# Patient Record
Sex: FEMALE | Race: WHITE | NOT HISPANIC OR LATINO | Employment: FULL TIME | URBAN - METROPOLITAN AREA
[De-identification: names, ages, dates, MRNs, and addresses within clinical notes are randomized per-mention and may not be internally consistent; named-entity substitution may affect disease eponyms.]

---

## 2017-02-27 ENCOUNTER — OFFICE VISIT (OUTPATIENT)
Dept: INTERNAL MEDICINE | Facility: CLINIC | Age: 32
End: 2017-02-27

## 2017-02-27 VITALS
SYSTOLIC BLOOD PRESSURE: 112 MMHG | OXYGEN SATURATION: 98 % | TEMPERATURE: 98.8 F | HEART RATE: 58 BPM | HEIGHT: 68 IN | DIASTOLIC BLOOD PRESSURE: 70 MMHG | WEIGHT: 113.32 LBS | BODY MASS INDEX: 17.17 KG/M2

## 2017-02-27 DIAGNOSIS — J06.9 ACUTE URI: Primary | ICD-10-CM

## 2017-02-27 DIAGNOSIS — Z72.0 TOBACCO USE: ICD-10-CM

## 2017-02-27 DIAGNOSIS — R53.83 OTHER FATIGUE: ICD-10-CM

## 2017-02-27 LAB
EXPIRATION DATE: NORMAL
EXPIRATION DATE: NORMAL
FLUAV AG NPH QL: NORMAL
FLUBV AG NPH QL: NEGATIVE
INTERNAL CONTROL: NORMAL
INTERNAL CONTROL: NORMAL
Lab: NORMAL
Lab: NORMAL
S PYO AG THROAT QL: NEGATIVE

## 2017-02-27 PROCEDURE — 87880 STREP A ASSAY W/OPTIC: CPT | Performed by: PHYSICIAN ASSISTANT

## 2017-02-27 PROCEDURE — 87804 INFLUENZA ASSAY W/OPTIC: CPT | Performed by: PHYSICIAN ASSISTANT

## 2017-02-27 PROCEDURE — 99406 BEHAV CHNG SMOKING 3-10 MIN: CPT | Performed by: PHYSICIAN ASSISTANT

## 2017-02-27 PROCEDURE — 99213 OFFICE O/P EST LOW 20 MIN: CPT | Performed by: PHYSICIAN ASSISTANT

## 2017-02-27 NOTE — ASSESSMENT & PLAN NOTE
Smoking Cessation Counseling  DX: tobacco use    Discussed current use pattern.  Follow up with PCP in 1 month or as needed.  Sent in prescription for Chantix  Barriers: friends smoke and spouse/partner smokes  Time spent counseling: 3-10 mintues

## 2017-02-27 NOTE — PROGRESS NOTES
Chief Complaint   Patient presents with   • Sore Throat     C/o dry cough, low grade temp, thoracic back pain and neck pain and sore throat x 3 days       Subjective   Yari Carlson is a 31 y.o. female.       History of Present Illness     Pt started feeling bad 3 days ago with fatigue and malaise. Had to work the next 2 days, 12+ hr days. By the end of day yesterday felt feverish. Has ST, neck pain, hurts to take deep breaths. Mild cough and sneezing and congestion. Not taking anything otc. Some sick contacts at work, no known flu.        Current Outpatient Prescriptions:   •  APAP-isometheptene-dichloral -325 MG per capsule, Take 1 capsule by mouth Every 6 (Six) Hours As Needed., Disp: , Rfl:   •  topiramate (TOPAMAX) 100 MG tablet, Take 1 tablet by mouth Every Night., Disp: 30 tablet, Rfl: 11  •  varenicline (CHANTIX STARTING MONTH PAK) 0.5 MG X 11 & 1 MG X 42 tablet, Take 0.5 mg one daily on days 1-2 and 0.5 mg twice daily on days 4-7. Then 1 mg twice daily for a total of 12 weeks., Disp: 53 tablet, Rfl: 0  •  vitamin B-12 (CYANOCOBALAMIN) 1000 MCG tablet, Take 1 tablet by mouth Daily., Disp: 30 tablet, Rfl: 11     PMFSH  The following portions of the patient's history were reviewed and updated as appropriate: allergies, current medications, past family history, past medical history, past social history, past surgical history and problem list.    Review of Systems   Constitutional: Positive for fatigue. Negative for activity change and unexpected weight change.   HENT: Positive for congestion, postnasal drip and sore throat. Negative for ear pain.    Eyes: Negative for pain and discharge.   Respiratory: Positive for cough. Negative for chest tightness, shortness of breath and wheezing.    Cardiovascular: Negative for chest pain and palpitations.   Gastrointestinal: Negative for abdominal pain, diarrhea and vomiting.   Endocrine: Negative.    Genitourinary: Negative.    Musculoskeletal: Negative for  "joint swelling.   Skin: Negative for color change, rash and wound.   Allergic/Immunologic: Negative.    Neurological: Negative for seizures and syncope.   Psychiatric/Behavioral: Negative.    All other systems reviewed and are negative.      Objective   Visit Vitals   • /70   • Pulse 58   • Temp 98.8 °F (37.1 °C)   • Ht 68\" (172.7 cm)   • Wt 113 lb 5.1 oz (51.4 kg)   • SpO2 98%   • BMI 17.23 kg/m2       Physical Exam   Constitutional: She is oriented to person, place, and time. She appears well-developed and well-nourished.  Non-toxic appearance. No distress.   HENT:   Head: Normocephalic and atraumatic. Hair is normal.   Right Ear: External ear normal. No drainage, swelling or tenderness. Tympanic membrane is retracted.   Left Ear: External ear normal. No drainage, swelling or tenderness. Tympanic membrane is retracted.   Nose: Mucosal edema present. No epistaxis.   Mouth/Throat: Uvula is midline and mucous membranes are normal. No oral lesions. No uvula swelling. Posterior oropharyngeal erythema present. No oropharyngeal exudate.   Eyes: Conjunctivae and EOM are normal. Pupils are equal, round, and reactive to light. Right eye exhibits no discharge. Left eye exhibits no discharge. No scleral icterus.   Neck: Normal range of motion. Neck supple.   Cardiovascular: Normal rate, regular rhythm and normal heart sounds.  Exam reveals no gallop.    No murmur heard.  Pulmonary/Chest: Breath sounds normal. No stridor. No respiratory distress. She has no wheezes. She has no rales. She exhibits no tenderness.   Abdominal: Soft. There is no tenderness.   Lymphadenopathy:     She has cervical adenopathy.   Neurological: She is alert and oriented to person, place, and time. She exhibits normal muscle tone.   Skin: Skin is warm and dry. No rash noted. She is not diaphoretic.   Psychiatric: She has a normal mood and affect. Her behavior is normal. Judgment and thought content normal.   Nursing note and vitals " reviewed.      Results for orders placed or performed in visit on 02/27/17   POCT Influenza A/B   Result Value Ref Range    Rapid Influenza A Ag negaitive     Rapid Influenza B Ag negative     Internal Control Passed Passed    Lot Number [id number]     Expiration Date 4/2017    POCT rapid strep A   Result Value Ref Range    Rapid Strep A Screen Negative Negative, VALID, INVALID, Not Performed    Internal Control Passed Passed    Lot Number [id number]     Expiration Date 3/2018         ASSESSMENT/PLAN    Problem List Items Addressed This Visit        Other    Fatigue    Relevant Orders    POCT Influenza A/B (Completed)    Tobacco use     Smoking Cessation Counseling  DX: tobacco use    Discussed current use pattern.  Follow up with PCP in 1 month or as needed.  Sent in prescription for Chantix  Barriers: friends smoke and spouse/partner smokes  Time spent counseling: 3-10 mintues                 Relevant Medications    varenicline (CHANTIX STARTING MONTH PAK) 0.5 MG X 11 & 1 MG X 42 tablet      Other Visit Diagnoses     Acute URI    -  Primary    Relevant Orders    POCT rapid strep A (Completed)      Use OTC symptomatic care, increase rest and fluids. Call if worsening or no improvement.           Return in about 4 weeks (around 3/27/2017) for Recheck on smoking cessation with Tere.

## 2017-02-28 ENCOUNTER — TELEPHONE (OUTPATIENT)
Dept: INTERNAL MEDICINE | Facility: CLINIC | Age: 32
End: 2017-02-28

## 2017-02-28 DIAGNOSIS — Z72.0 TOBACCO USE: Primary | ICD-10-CM

## 2017-02-28 RX ORDER — BUPROPION HYDROCHLORIDE 150 MG/1
TABLET, EXTENDED RELEASE ORAL
Qty: 60 TABLET | Refills: 1 | Status: SHIPPED | OUTPATIENT
Start: 2017-02-28 | End: 2017-03-20 | Stop reason: SINTOL

## 2017-02-28 NOTE — TELEPHONE ENCOUNTER
PA for Chantix was denied, denial ppw given to Bessie for further review.     Called and informed pt that it was denied and if she would like to try Wellbutrin instead, per pt she would like to give Wellbutrin a try.

## 2017-02-28 NOTE — TELEPHONE ENCOUNTER
2/28/17    Started a PA for chantix:    josh blanco (Key: [id number])     You will receive an [facility] within 24-72 hours. You can see the latest determination by locating this request on your dashboard or by reopening this request. You will also receive a faxed copy of the determination.

## 2017-02-28 NOTE — TELEPHONE ENCOUNTER
----- Message from Anay Lopez MA sent at 2/27/2017 11:16 AM EST -----      ----- Message -----     From: Nieves Rader     Sent: 2/27/2017  11:13 AM       To: [facility]    The patient is calling because her pharmacy told her that the chantix needed a PA. She just wanted to let the office know to be looking for it.

## 2017-02-28 NOTE — TELEPHONE ENCOUNTER
I sent in the wellbutrin  mg. She should pick a day to quit smoking and start the medication 7 days before the quit date. Follow instructions on the bottle. thanks

## 2017-03-20 ENCOUNTER — OFFICE VISIT (OUTPATIENT)
Dept: INTERNAL MEDICINE | Facility: CLINIC | Age: 32
End: 2017-03-20

## 2017-03-20 VITALS
TEMPERATURE: 98 F | DIASTOLIC BLOOD PRESSURE: 60 MMHG | HEIGHT: 68 IN | OXYGEN SATURATION: 99 % | SYSTOLIC BLOOD PRESSURE: 108 MMHG | BODY MASS INDEX: 16.97 KG/M2 | WEIGHT: 112 LBS | HEART RATE: 104 BPM

## 2017-03-20 DIAGNOSIS — N30.00 ACUTE CYSTITIS WITHOUT HEMATURIA: ICD-10-CM

## 2017-03-20 DIAGNOSIS — R11.0 NAUSEA: ICD-10-CM

## 2017-03-20 DIAGNOSIS — R10.9 ABDOMINAL PAIN IN FEMALE: Primary | ICD-10-CM

## 2017-03-20 LAB
BILIRUB BLD-MCNC: ABNORMAL MG/DL
CLARITY, POC: CLEAR
COLOR UR: ABNORMAL
GLUCOSE UR STRIP-MCNC: NEGATIVE MG/DL
KETONES UR QL: NEGATIVE
LEUKOCYTE EST, POC: ABNORMAL
NITRITE UR-MCNC: NEGATIVE MG/ML
PH UR: 7 [PH] (ref 5–8)
PROT UR STRIP-MCNC: NEGATIVE MG/DL
RBC # UR STRIP: NEGATIVE /UL
SP GR UR: 1.01 (ref 1–1.03)
UROBILINOGEN UR QL: ABNORMAL

## 2017-03-20 PROCEDURE — 99213 OFFICE O/P EST LOW 20 MIN: CPT | Performed by: NURSE PRACTITIONER

## 2017-03-20 PROCEDURE — 81003 URINALYSIS AUTO W/O SCOPE: CPT | Performed by: NURSE PRACTITIONER

## 2017-03-20 PROCEDURE — 87086 URINE CULTURE/COLONY COUNT: CPT | Performed by: NURSE PRACTITIONER

## 2017-03-20 RX ORDER — PROMETHAZINE HYDROCHLORIDE 25 MG/1
25 TABLET ORAL EVERY 6 HOURS PRN
Qty: 24 TABLET | Refills: 1 | Status: SHIPPED | OUTPATIENT
Start: 2017-03-20 | End: 2017-04-24 | Stop reason: SDUPTHER

## 2017-03-20 RX ORDER — NITROFURANTOIN 25; 75 MG/1; MG/1
100 CAPSULE ORAL 2 TIMES DAILY
Qty: 14 CAPSULE | Refills: 0 | Status: SHIPPED | OUTPATIENT
Start: 2017-03-20 | End: 2017-03-27

## 2017-03-20 NOTE — PROGRESS NOTES
Subjective  Abdominal Pain (ongoing for 24 hours, not able to eat much, burning sensation inside stomach ); Nausea; Dizziness; and Fatigue      Yari Carlson is a 31 y.o. female.   Allergies   Allergen Reactions   • Naproxen      History of Present Illness      Nausea, dizziness  X 3 days , generalized abd pain , can't eat , came on rather suddenly, had been at a child's birthday party , feeling feverish, feels a little like burning when she drinks coffee , no dysuria, no otc meds tried , some frequency but drinks a lot of water  The following portions of the patient's history were reviewed and updated as appropriate: allergies, current medications, past family history, past medical history, past social history, past surgical history and problem list.    Review of Systems   Constitutional: Positive for fatigue and fever.   Gastrointestinal: Positive for abdominal pain and nausea.   Neurological: Positive for dizziness.   All other systems reviewed and are negative.      Objective   Physical Exam   Constitutional: She is oriented to person, place, and time. She appears well-developed and well-nourished. No distress.   HENT:   Head: Normocephalic and atraumatic.   Eyes: Conjunctivae are normal. No scleral icterus.   Cardiovascular: Normal rate, regular rhythm and normal heart sounds.  Exam reveals no gallop.    No murmur heard.  Pulmonary/Chest: Effort normal and breath sounds normal. No respiratory distress. She has no wheezes. She has no rales. She exhibits no tenderness.   Abdominal: Soft. Bowel sounds are normal. She exhibits no distension, no abdominal bruit, no ascites and no mass. There is no splenomegaly or hepatomegaly. There is tenderness in the suprapubic area. There is no rigidity, no rebound, no guarding, no CVA tenderness, no tenderness at McBurney's point and negative Santiago's sign. No hernia.   Musculoskeletal: Normal range of motion. She exhibits no deformity.   Neurological: She is alert and  "oriented to person, place, and time.   Skin: Skin is warm and dry. No rash noted. She is not diaphoretic.   Psychiatric: She has a normal mood and affect. Her behavior is normal. Judgment and thought content normal.   Nursing note and vitals reviewed.    Visit Vitals   • /60   • Pulse 104   • Temp 98 °F (36.7 °C)   • Ht 68\" (172.7 cm)   • Wt 112 lb (50.8 kg)   • SpO2 99%   • BMI 17.03 kg/m2       Assessment/Plan     Problem List Items Addressed This Visit     None      Visit Diagnoses     Abdominal pain in female    -  Primary    Relevant Orders    POCT urinalysis dipstick, automated (Completed)    Acute cystitis without hematuria        Relevant Medications    nitrofurantoin, macrocrystal-monohydrate, (MACROBID) 100 MG capsule    Nausea        Relevant Medications    promethazine (PHENERGAN) 25 MG tablet          Results for orders placed or performed in visit on 03/20/17   POCT urinalysis dipstick, automated   Result Value Ref Range    Color Dark Yellow Yellow, Straw, Dark Yellow, Hazel    Clarity, UA Clear Clear    Glucose, UA Negative Negative, 1000 mg/dL (3+) mg/dL    Bilirubin 1 mg/dL (A) Negative    Ketones, UA Negative Negative    Specific Gravity  1.010 1.005 - 1.030    Blood, UA Negative Negative    pH, Urine 7.0 5.0 - 8.0    Protein, POC Negative Negative mg/dL    Urobilinogen, UA 4 E.U./dL  (A) Normal    Leukocytes 500 Josefa/ul (A) Negative    Nitrite, UA Negative Negative     Will send urine for cx, increase fluids     "

## 2017-03-22 LAB — BACTERIA SPEC AEROBE CULT: NORMAL

## 2017-04-03 ENCOUNTER — OFFICE VISIT (OUTPATIENT)
Dept: INTERNAL MEDICINE | Facility: CLINIC | Age: 32
End: 2017-04-03

## 2017-04-03 VITALS
BODY MASS INDEX: 17.13 KG/M2 | HEART RATE: 92 BPM | OXYGEN SATURATION: 98 % | DIASTOLIC BLOOD PRESSURE: 62 MMHG | WEIGHT: 113 LBS | HEIGHT: 68 IN | SYSTOLIC BLOOD PRESSURE: 108 MMHG

## 2017-04-03 DIAGNOSIS — Z72.0 TOBACCO ABUSE: Primary | ICD-10-CM

## 2017-04-03 PROCEDURE — 99406 BEHAV CHNG SMOKING 3-10 MIN: CPT | Performed by: NURSE PRACTITIONER

## 2017-04-03 PROCEDURE — 99213 OFFICE O/P EST LOW 20 MIN: CPT | Performed by: NURSE PRACTITIONER

## 2017-04-03 RX ORDER — POLYETHYLENE GLYCOL 3350 17 G
4 POWDER IN PACKET (EA) ORAL AS NEEDED
Qty: 135 LOZENGE | Refills: 1 | Status: SHIPPED | OUTPATIENT
Start: 2017-04-03 | End: 2017-10-20

## 2017-04-03 RX ORDER — NORETHINDRONE 0.35 MG/1
TABLET ORAL
COMMUNITY
Start: 2017-03-29 | End: 2017-06-01 | Stop reason: SDUPTHER

## 2017-04-03 NOTE — PROGRESS NOTES
"Subjective  Follow-up (Wellbutrin caused panic attacks, would like to discuss changing meds)      Yari Carlson is a 31 y.o. female.   Allergies   Allergen Reactions   • Naproxen      History of Present Illness      Was tried on wellbutrin for smoking and it caused her to have anxiety , is smoking almost 1 ppd , [place] [employer] , would like to be on chantix but knows her ins will not pay   The following portions of the patient's history were reviewed and updated as appropriate: allergies, current medications, past family history, past medical history, past social history, past surgical history and problem list.    Review of Systems   Respiratory:        Smoker   All other systems reviewed and are negative.      Objective   Physical Exam   Constitutional: She is oriented to person, place, and time. She appears well-developed and well-nourished.   Pulmonary/Chest: Effort normal.   Neurological: She is alert and oriented to person, place, and time.   Skin: Skin is warm and dry.   Psychiatric: She has a normal mood and affect. Her behavior is normal. Judgment and thought content normal.   Nursing note and vitals reviewed.    /62  Pulse 92  Ht 68\" (172.7 cm)  Wt 113 lb (51.3 kg)  SpO2 98%  BMI 17.18 kg/m2    Assessment/Plan     Problem List Items Addressed This Visit     None      Visit Diagnoses     Tobacco abuse    -  Primary    Relevant Medications    nicotine polacrilex (EQL NICOTINE) 4 MG lozenge        Smoking Cessation Counseling  DX:tobacco abuse  I advised patient to quit, and offered support.  Discussed current use pattern.  Barriers: friends smoke, spouse/partner smokes and under a lot of stress now  Time spent counseling: 3-10 mintues      rtc 5 weeks         "

## 2017-04-05 ENCOUNTER — TELEPHONE (OUTPATIENT)
Dept: INTERNAL MEDICINE | Facility: CLINIC | Age: 32
End: 2017-04-05

## 2017-04-17 ENCOUNTER — OFFICE VISIT (OUTPATIENT)
Dept: OBSTETRICS AND GYNECOLOGY | Facility: CLINIC | Age: 32
End: 2017-04-17

## 2017-04-17 VITALS
DIASTOLIC BLOOD PRESSURE: 64 MMHG | WEIGHT: 113.2 LBS | SYSTOLIC BLOOD PRESSURE: 92 MMHG | HEIGHT: 68 IN | BODY MASS INDEX: 17.16 KG/M2

## 2017-04-17 DIAGNOSIS — Z30.41 SURVEILLANCE FOR BIRTH CONTROL, ORAL CONTRACEPTIVES: ICD-10-CM

## 2017-04-17 DIAGNOSIS — R87.612 LOW GRADE SQUAMOUS INTRAEPITHELIAL LESION ON CYTOLOGIC SMEAR OF CERVIX (LGSIL): Primary | ICD-10-CM

## 2017-04-17 PROCEDURE — 99213 OFFICE O/P EST LOW 20 MIN: CPT | Performed by: OBSTETRICS & GYNECOLOGY

## 2017-04-17 NOTE — PROGRESS NOTES
Chief Complaint   Patient presents with   • Follow-up     repeat pap   • Menstrual Problem     irregular menses on [facility]'s       Yari Carlson is a 31 y.o. year old  presenting to be seen for a PAP in follow-up of a prior abnormal PAP and/or HPV screen. Her previous Pap test was read as low-grade NICCI, with HPV, non-16/18 positivity.  She is currently on a progestin-only oral contraceptive and has irregular menses.  She states that she has had heavy bleeding on other forms of contraception.    Her last PAP: was done on approximately 2016 and the result was: LGSIL - mild dysplasia.  Her last HPV test: was done on approximately 2016 and the result was:  HPV non/16/18 (+)  Uses tobacco currently: no  Sexually active: yes  Current contraception: oral progesterone-only contraceptive    No Additional Complaints Reported    The following portions of the patient's history were reviewed and updated as appropriate:vital signs and   She  does not have any pertinent problems on file.  She  has a past surgical history that includes Abdominal surgery and Tooth extraction.  Her family history includes Arthritis in her paternal aunt; Cancer in her maternal grandmother, other, and paternal grandmother; Diabetes in her maternal grandfather, maternal grandmother, other, paternal grandfather, and paternal grandmother; Heart disease in her maternal grandmother; Hyperlipidemia in her maternal grandmother; Lupus in her paternal aunt; Migraines in her maternal grandfather and mother; Obesity in her maternal grandmother; Other in her other; Stroke in her other; Thyroid disease in her mother.  She  reports that she has been smoking.  She has never used smokeless tobacco. She reports that she drinks alcohol. She reports that she does not use illicit drugs.  Current Outpatient Prescriptions   Medication Sig Dispense Refill   • APAP-isometheptene-dichloral -325 MG per capsule Take 1 capsule by mouth Every 6 (Six) Hours  "As Needed.     • ALYSA 0.35 MG tablet      • nicotine polacrilex (EQL NICOTINE) 4 MG lozenge Dissolve 1 lozenge in the mouth As Needed for Smoking Cessation. 135 lozenge 1   • promethazine (PHENERGAN) 25 MG tablet Take 1 tablet by mouth Every 6 (Six) Hours As Needed for Nausea or Vomiting. 24 tablet 1   • topiramate (TOPAMAX) 100 MG tablet Take 1 tablet by mouth Every Night. 30 tablet 11   • vitamin B-12 (CYANOCOBALAMIN) 1000 MCG tablet Take 1 tablet by mouth Daily. 30 tablet 11     No current facility-administered medications for this visit.      She is allergic to naproxen..    Review of Systems  A comprehensive review of systems was negative.  Constitutional: negative for fever, chills, activity change, appetite change, fatigue and unexpected weight change.  Respiratory: negative  Cardiovascular: negative  Gastrointestinal: negative  Genitourinary:negative  Musculoskeletal:negative  Behavioral/Psych: negative        BP 92/64  Ht 68\" (172.7 cm)  Wt 113 lb 3.2 oz (51.3 kg)  LMP 02/01/2017  BMI 17.21 kg/m2    Physical Exam    General:  alert; cooperative; well developed; well nourished   Thyroid: normal to inspection and palpation   Abdomen: soft, non-tender; no masses  no umbilical or inginual hernias are present  no hepato-splenomegaly   Pelvis: Clinical staff was present for exam  External genitalia:  normal appearance of the external genitalia including Bartholin's and [place]'s glands.  Vaginal:  normal pink mucosa without prolapse or lesions.  Cervix:  normal appearance.  Uterus:  normal size, shape and consistency. anteverted;  Adnexa:  normal bimanual exam of the adnexa.  Rectal:  anus visually normal appearing. recto-vaginal exam unremarkable and confirms findings;     Lab Review   Pap results    Imaging   No data reviewed       ASSESSMENT  Problems Addressed this Visit        Other    Abnormal Pap smear of cervix - Primary    Relevant Orders    Liquid-based Pap Smear, Screening    Surveillance for birth " control, oral contraceptives              PLAN   Pap test done  Follow up: 6 month(s)   *Please note that portions of this documentation may have been completed with a voice recognition program.  Efforts were made to edit this dictation, but occasional words may have been mistranscribed.  This note was electronically signed.    STORMY uMrillo MD  April 17, 2017  3:29 PM

## 2017-04-24 ENCOUNTER — OFFICE VISIT (OUTPATIENT)
Dept: NEUROLOGY | Facility: CLINIC | Age: 32
End: 2017-04-24

## 2017-04-24 ENCOUNTER — TELEPHONE (OUTPATIENT)
Dept: NEUROLOGY | Facility: CLINIC | Age: 32
End: 2017-04-24

## 2017-04-24 VITALS
RESPIRATION RATE: 18 BRPM | HEART RATE: 101 BPM | HEIGHT: 68 IN | SYSTOLIC BLOOD PRESSURE: 93 MMHG | DIASTOLIC BLOOD PRESSURE: 61 MMHG | WEIGHT: 113 LBS | BODY MASS INDEX: 17.13 KG/M2

## 2017-04-24 DIAGNOSIS — G43.009 MIGRAINE WITHOUT AURA AND WITHOUT STATUS MIGRAINOSUS, NOT INTRACTABLE: ICD-10-CM

## 2017-04-24 DIAGNOSIS — R11.0 NAUSEA: ICD-10-CM

## 2017-04-24 DIAGNOSIS — G43.709 CHRONIC MIGRAINE WITHOUT AURA WITHOUT STATUS MIGRAINOSUS, NOT INTRACTABLE: Primary | ICD-10-CM

## 2017-04-24 PROCEDURE — 99213 OFFICE O/P EST LOW 20 MIN: CPT | Performed by: NURSE PRACTITIONER

## 2017-04-24 RX ORDER — PROMETHAZINE HYDROCHLORIDE 25 MG/1
25 TABLET ORAL EVERY 6 HOURS PRN
Qty: 24 TABLET | Refills: 3 | Status: SHIPPED | OUTPATIENT
Start: 2017-04-24 | End: 2017-12-29 | Stop reason: SDUPTHER

## 2017-04-24 NOTE — PROGRESS NOTES
Subjective:     Patient ID: Yari Carlson is a 31 y.o. female.    History of Present Illness   Here for 6 month follow up on chronic migraine headaches since age 6. She has been having 2-3 migraines a month for the past 6 months. Over the past 2 weeks she has had 5 migraines. She is not sure if this is related to weather changes or not. She has had a lot of nausea with her migraines. She is currently taking Topamax 100mg QHS and tolerating this well for migraine prevention. She takes vitamin b12 1000mcg daily for tingling and this has improved. She is taking Midrin PRN and Phenergan PRN and needs refills on these. She has nausea, occasional vomiting, photophobia, phonophobia, throbbing, unilateral, typically frontal, moderate to severe in nature and lasts 4-24 hours.     The following portions of the patient's history were reviewed and updated as appropriate: allergies, current medications, past family history, past medical history, past social history, past surgical history and problem list.    Review of Systems   Constitutional: Negative for chills, fatigue, fever and unexpected weight change.   HENT: Negative for ear pain, hearing loss, nosebleeds, rhinorrhea and sore throat.    Eyes: Negative for photophobia, pain, discharge, itching and visual disturbance.   Respiratory: Negative for cough, chest tightness, shortness of breath and wheezing.    Cardiovascular: Negative for chest pain, palpitations and leg swelling.   Gastrointestinal: Negative for abdominal pain, blood in stool, constipation, diarrhea, nausea and vomiting.   Genitourinary: Negative for dysuria, frequency, hematuria and urgency.   Musculoskeletal: Negative for arthralgias, back pain, gait problem, joint swelling, myalgias, neck pain and neck stiffness.   Skin: Negative for rash and wound.   Allergic/Immunologic: Negative for environmental allergies and food allergies.   Neurological: Positive for headaches. Negative for dizziness, tremors,  seizures, syncope, speech difficulty, weakness, light-headedness and numbness.   Hematological: Negative for adenopathy. Does not bruise/bleed easily.   Psychiatric/Behavioral: Negative for agitation, confusion, decreased concentration, hallucinations, sleep disturbance and suicidal ideas. The patient is not nervous/anxious.         Objective:    Neurologic Exam     Mental Status   Oriented to person, place, and time.   Level of consciousness: alert    Cranial Nerves   Cranial nerves II through XII intact.     Motor Exam   Muscle bulk: normal  Overall muscle tone: normal    Strength   Strength 5/5 throughout.     Gait, Coordination, and Reflexes     Gait  Gait: normal      Physical Exam   Constitutional: She is oriented to person, place, and time.   Neurological: She is oriented to person, place, and time. She has normal strength. Gait normal.       Assessment/Plan:     Yari was seen today for migraine.    Diagnoses and all orders for this visit:    Chronic migraine without aura without status migrainosus, not intractable  -     APAP-isometheptene-dichloral -325 MG per capsule; Take 1 capsule by mouth Every 6 (Six) Hours As Needed for migraine.    Migraine without aura and without status migrainosus, not intractable    Nausea  -     promethazine (PHENERGAN) 25 MG tablet; Take 1 tablet by mouth Every 6 (Six) Hours As Needed for Nausea or Vomiting.       Continue current meds. F/U 5-6 months. If migraines worsen or change, then she is to call for sooner appointment and would consider MRI brain eval. Reviewed medications, potential side effects and signs and symptoms to report. Discussed risk versus benefits of treatment plan with patient and/or family-including medications, labs and radiology that may be ordered. Addressed questions and concerns during visit. Patient and/or family verbalized understanding and agree with plan. As part of this patient's treatment plan I am prescribing controlled substances.  The patient has been made aware of appropriate use of such medications, including potential risk of somnolence, limited ability to drive and/or work safely, and potential for dependence or overdose. It has also been made clear that these medications are for use by the patient only, without concomitant use of alcohol or other substances unless prescribed. Keep out of reach of children.  Keith report has been reviewed. If this is going to be prescribed long term, [facility] [place]rolled [place] Agreement Contract has also been read and signed by patient and myself. Labs due next visit if not already done cbc and cmp.

## 2017-06-01 NOTE — TELEPHONE ENCOUNTER
"Pls advise on refill, per chart says \"Historical Prescriber.\" Have you been prescribing this for pt? LOV: 04/03/17.  "

## 2017-06-02 RX ORDER — NORETHINDRONE 0.35 MG/1
TABLET ORAL
Qty: 28 TABLET | Refills: 9 | Status: SHIPPED | OUTPATIENT
Start: 2017-06-02 | End: 2018-01-13 | Stop reason: SDUPTHER

## 2017-06-26 ENCOUNTER — OFFICE VISIT (OUTPATIENT)
Dept: INTERNAL MEDICINE | Facility: CLINIC | Age: 32
End: 2017-06-26

## 2017-06-26 VITALS
BODY MASS INDEX: 17.03 KG/M2 | WEIGHT: 112 LBS | HEART RATE: 110 BPM | SYSTOLIC BLOOD PRESSURE: 100 MMHG | OXYGEN SATURATION: 99 % | DIASTOLIC BLOOD PRESSURE: 62 MMHG

## 2017-06-26 DIAGNOSIS — B37.31 VAGINAL CANDIDIASIS: ICD-10-CM

## 2017-06-26 DIAGNOSIS — N30.00 ACUTE CYSTITIS WITHOUT HEMATURIA: Primary | ICD-10-CM

## 2017-06-26 LAB
BILIRUB BLD-MCNC: NEGATIVE MG/DL
CLARITY, POC: CLEAR
COLOR UR: YELLOW
GLUCOSE UR STRIP-MCNC: NEGATIVE MG/DL
KETONES UR QL: NEGATIVE
LEUKOCYTE EST, POC: ABNORMAL
NITRITE UR-MCNC: NEGATIVE MG/ML
PH UR: 6 [PH] (ref 5–8)
PROT UR STRIP-MCNC: NEGATIVE MG/DL
RBC # UR STRIP: NEGATIVE /UL
SP GR UR: 1.01 (ref 1–1.03)
UROBILINOGEN UR QL: NORMAL

## 2017-06-26 PROCEDURE — 99213 OFFICE O/P EST LOW 20 MIN: CPT | Performed by: NURSE PRACTITIONER

## 2017-06-26 PROCEDURE — 81003 URINALYSIS AUTO W/O SCOPE: CPT | Performed by: NURSE PRACTITIONER

## 2017-06-26 RX ORDER — FLUCONAZOLE 150 MG/1
150 TABLET ORAL ONCE
Qty: 1 TABLET | Refills: 2 | Status: SHIPPED | OUTPATIENT
Start: 2017-06-26 | End: 2017-06-26

## 2017-06-26 RX ORDER — NITROFURANTOIN 25; 75 MG/1; MG/1
100 CAPSULE ORAL 2 TIMES DAILY
Qty: 14 CAPSULE | Refills: 0 | Status: SHIPPED | OUTPATIENT
Start: 2017-06-26 | End: 2017-07-03

## 2017-10-09 ENCOUNTER — OFFICE VISIT (OUTPATIENT)
Dept: NEUROLOGY | Facility: CLINIC | Age: 32
End: 2017-10-09

## 2017-10-09 VITALS
WEIGHT: 113 LBS | BODY MASS INDEX: 17.13 KG/M2 | HEART RATE: 79 BPM | DIASTOLIC BLOOD PRESSURE: 76 MMHG | SYSTOLIC BLOOD PRESSURE: 107 MMHG | HEIGHT: 68 IN | RESPIRATION RATE: 18 BRPM

## 2017-10-09 DIAGNOSIS — G43.709 CHRONIC MIGRAINE WITHOUT AURA WITHOUT STATUS MIGRAINOSUS, NOT INTRACTABLE: ICD-10-CM

## 2017-10-09 DIAGNOSIS — G43.719 INTRACTABLE CHRONIC MIGRAINE WITHOUT AURA AND WITHOUT STATUS MIGRAINOSUS: Primary | ICD-10-CM

## 2017-10-09 PROBLEM — Z72.0 TOBACCO USE: Status: RESOLVED | Noted: 2017-02-27 | Resolved: 2017-10-09

## 2017-10-09 PROCEDURE — 99213 OFFICE O/P EST LOW 20 MIN: CPT | Performed by: NURSE PRACTITIONER

## 2017-10-09 RX ORDER — TOPIRAMATE 100 MG/1
100 TABLET, FILM COATED ORAL NIGHTLY
Qty: 30 TABLET | Refills: 11 | Status: SHIPPED | OUTPATIENT
Start: 2017-10-09 | End: 2017-10-09

## 2017-10-09 RX ORDER — TOPIRAMATE 100 MG/1
100 CAPSULE, EXTENDED RELEASE ORAL NIGHTLY
Qty: 30 CAPSULE | Refills: 11 | Status: SHIPPED | OUTPATIENT
Start: 2017-10-09 | End: 2018-04-16

## 2017-10-09 NOTE — PROGRESS NOTES
Subjective:     Patient ID: Yari Carlson is a 31 y.o. female.    CC:   Chief Complaint   Patient presents with   • Migraine       HPI:   History of Present Illness   This is a 31-year-old female who presents for six-month follow-up on chronic migraine headaches which have been present since age 6 years old.  She has been taking topiramate  mg daily at bedtime for migraine prevention and was previously on the Trokendi  mg daily at bedtime which was helping her headaches more overall.  She tells me her insurance previously did not cover this and so she had to switch back to the generic.  She is wondering about switching back to the name brand as it was more effective for her migraines and she was having fewer of these per month.  She tells me she does continue to have chronic daily headaches which are more mild, aching and without migraines symptoms.  She tells me she is having about 4-5 migraines per month.  Her migraines occur on the right frontal and temporal area, throbbing, nausea, occasional vomiting, photophobia and phonophobia and last 5-12 hours.  She takes 2 Midrin which do seem to help.  She tells me that she was engaged and called this off and also has several family members with serious cancers and has had increased stress in her life.  She feels like her migraines have been more frequent but have not changed in the type of symptoms she has.  She denies any vision loss or unilateral weakness or other concerning symptoms.  She has never had an MRI of her brain.  She prefers to wait on any imaging unless she has changes in her migraines due to high cost.  She did quit smoking in June 2017.    The following portions of the patient's history were reviewed and updated as appropriate: allergies, current medications, past family history, past medical history, past social history, past surgical history and problem list.    Past Medical History:   Diagnosis Date   • Abnormal Pap smear of cervix     • Anxiety    • Bacterial vaginosis    • Dysfunctional uterine bleeding    • Headache    • Migraine        Past Surgical History:   Procedure Laterality Date   • ABDOMINAL SURGERY     • TOOTH EXTRACTION         Social History     Social History   • Marital status: Single     Spouse name: N/A   • Number of children: N/A   • Years of education: N/A     Occupational History   • Not on file.     Social History Main Topics   • Smoking status: Former Smoker     Packs/day: 0.25     Years: 7.00     Types: Cigarettes     Quit date: 6/1/2017   • Smokeless tobacco: Never Used   • Alcohol use Yes      Comment: social   • Drug use: No   • Sexual activity: Yes     Partners: Male     Birth control/ protection: OCP     Other Topics Concern   • Not on file     Social History Narrative       Family History   Problem Relation Age of Onset   • Thyroid disease Mother    • Migraines Mother    • Diabetes Maternal Grandmother    • Cancer Maternal Grandmother    • Heart disease Maternal Grandmother    • Hyperlipidemia Maternal Grandmother    • Obesity Maternal Grandmother    • Diabetes Maternal Grandfather    • Migraines Maternal Grandfather    • Diabetes Paternal Grandmother    • Cancer Paternal Grandmother    • Diabetes Paternal Grandfather      type 1   • Arthritis Paternal Aunt      rheumatoid   • Lupus Paternal Aunt    • Other Other      cardiac disorder   • Stroke Other    • Diabetes Other    • Cancer Other         Review of Systems   Constitutional: Negative for chills, fatigue, fever and unexpected weight change.   HENT: Negative for ear pain, hearing loss, nosebleeds, rhinorrhea and sore throat.    Eyes: Negative for photophobia, pain, discharge, itching and visual disturbance.   Respiratory: Negative for cough, chest tightness, shortness of breath and wheezing.    Cardiovascular: Negative for chest pain, palpitations and leg swelling.   Gastrointestinal: Negative for abdominal pain, blood in stool, constipation, diarrhea, nausea  and vomiting.   Genitourinary: Negative for dysuria, frequency, hematuria and urgency.   Musculoskeletal: Positive for neck pain and neck stiffness. Negative for arthralgias, back pain, gait problem, joint swelling and myalgias.   Skin: Negative for rash and wound.   Allergic/Immunologic: Negative for environmental allergies and food allergies.   Neurological: Positive for numbness and headaches. Negative for dizziness, tremors, seizures, syncope, speech difficulty, weakness and light-headedness.   Hematological: Negative for adenopathy. Does not bruise/bleed easily.   Psychiatric/Behavioral: Negative for agitation, confusion, decreased concentration, hallucinations, sleep disturbance and suicidal ideas. The patient is not nervous/anxious.         Objective:    Neurologic Exam     Mental Status   Oriented to person, place, and time.   Level of consciousness: alert    Cranial Nerves   Cranial nerves II through XII intact.     Motor Exam   Muscle bulk: normal  Overall muscle tone: normal    Strength   Strength 5/5 throughout.     Gait, Coordination, and Reflexes     Gait  Gait: normal    Coordination   Finger to nose coordination: normal  Heel to shin coordination: normal    Tremor   Resting tremor: absent  Intention tremor: absent  Action tremor: absent    Reflexes   Right brachioradialis: 2+  Left brachioradialis: 2+  Right biceps: 2+  Left biceps: 2+  Right triceps: 2+  Left triceps: 2+  Right patellar: 2+  Left patellar: 2+  Right achilles: 2+  Left achilles: 2+  Right : 2+  Left : 2+      Physical Exam   Constitutional: She is oriented to person, place, and time.   Neurological: She is oriented to person, place, and time. She has normal strength. She has a normal Finger-Nose-Finger Test and a normal Heel to Shin Test. Gait normal.   Reflex Scores:       Tricep reflexes are 2+ on the right side and 2+ on the left side.       Bicep reflexes are 2+ on the right side and 2+ on the left side.        Brachioradialis reflexes are 2+ on the right side and 2+ on the left side.       Patellar reflexes are 2+ on the right side and 2+ on the left side.       Achilles reflexes are 2+ on the right side and 2+ on the left side.      Assessment/Plan:       Yari was seen today for migraine.    Diagnoses and all orders for this visit:    Intractable chronic migraine without aura and without status migrainosus  -     TROKENDI  MG capsule sustained-release 24 hr; Take 100 mg by mouth Every Night.    Chronic migraine without aura without status migrainosus, not intractable  -     APAP-isometheptene-dichloral -325 MG per capsule; Take 1 capsule by mouth Every 6 (Six) Hours As Needed for Migraine.    Other orders  -     Discontinue: topiramate (TOPAMAX) 100 MG tablet; Take 1 tablet by mouth Every Night.         We will switch her back to the Trokendi  mg daily at bedtime for better coverage of her migraines.  We have refilled her Midrin for #60 with 2 refills.  She will call if she needs additional refills before her follow-up in 6 months.  She is also started a call if she has changes in her migraine symptoms to consider MRI of the brain.  She has had a lot of recent stressors and hopefully switching to the Trokendi XR will help at least decrease the frequency and severity of her headaches. As part of this patient's treatment plan I am prescribing controlled substances. The patient has been made aware of appropriate use of such medications, including potential risk of somnolence, limited ability to drive and/or work safely, and potential for dependence or overdose. It has also been made clear that these medications are for use by the patient only, without concomitant use of alcohol or other substances unless prescribed. Keep out of reach of children.  Keith report has been reviewed. If this is going to be prescribed long term, [facility] [place] Controlled Substance Agreement Contract has also been read and signed by  patient and myself.  Reviewed medications, potential side effects and signs and symptoms to report. Discussed risk versus benefits of treatment plan with patient and/or family-including medications, labs and radiology that may be ordered. Addressed questions and concerns during visit. Patient and/or family verbalized understanding and agree with plan.  Total time of visit 15 minutes face-to-face with 10 minutes spent in discussion and counseling on plan of care.    Gita Andersen, APRN  10/9/2017

## 2017-10-20 ENCOUNTER — TELEPHONE (OUTPATIENT)
Dept: INTERNAL MEDICINE | Facility: CLINIC | Age: 32
End: 2017-10-20

## 2017-10-20 ENCOUNTER — OFFICE VISIT (OUTPATIENT)
Dept: INTERNAL MEDICINE | Facility: CLINIC | Age: 32
End: 2017-10-20

## 2017-10-20 VITALS
DIASTOLIC BLOOD PRESSURE: 60 MMHG | BODY MASS INDEX: 17.28 KG/M2 | TEMPERATURE: 98.6 F | SYSTOLIC BLOOD PRESSURE: 104 MMHG | HEART RATE: 84 BPM | WEIGHT: 114 LBS | HEIGHT: 68 IN | OXYGEN SATURATION: 99 %

## 2017-10-20 DIAGNOSIS — J06.9 ACUTE URI: Primary | ICD-10-CM

## 2017-10-20 PROCEDURE — 99213 OFFICE O/P EST LOW 20 MIN: CPT | Performed by: NURSE PRACTITIONER

## 2017-10-20 RX ORDER — FLUTICASONE PROPIONATE 50 MCG
2 SPRAY, SUSPENSION (ML) NASAL DAILY
Qty: 1 BOTTLE | Refills: 0 | Status: SHIPPED | OUTPATIENT
Start: 2017-10-20 | End: 2018-03-04 | Stop reason: SDUPTHER

## 2017-10-20 RX ORDER — DEXTROMETHORPHAN HYDROBROMIDE AND PROMETHAZINE HYDROCHLORIDE 15; 6.25 MG/5ML; MG/5ML
5 SYRUP ORAL 4 TIMES DAILY PRN
Qty: 118 ML | Refills: 0 | Status: SHIPPED | OUTPATIENT
Start: 2017-10-20 | End: 2018-03-04

## 2017-10-20 NOTE — PATIENT INSTRUCTIONS
Upper Respiratory Infections include infection of the ears, nose/sinus, and throat. They are most often caused by a virus. Viruses can last 5-10 days and are not treated with antibiotics. Management of your symptoms is the goal of treatment.     Try to get as much rest as possible, drink water &/or herbal teas.Tylenol or Ibuprofen can also help ease pain and/or low grade fever. Sometimes immune support products like Emergen-C or Airborne can help you feel a little better sooner if they are started early on in the course of the illness.    For nasal congestion: you can use oxymetazoline (Afrin) twice a day for no more than 4 days. If your blood pressure is normal or well controlled you can use pseudoephedrine 30-60 mg every 6 hours as needed. This is sold at the pharmacy counter and usually works better than decongestants off the shelf.  For Sinus Pain/Pressure: Use warm compresses over nose & forehead, take hot steamy showers or breath in steam from a bowl of hot water, use cool-mist humidifier.  For Cough: I find that Delsym works quite well as does Vicks Vapor Rub applied to chest/neck at bedtime.  For Sore Throat: gargle periodically with warm salt water (1/2 tsp table salt in 8 oz warm water), sip on warm tea, use throat lozenges/cough drops.     If your symptoms get markedly worse, or have not begun to improve by day 7-8, please return to office for re-evaluation.

## 2017-10-20 NOTE — PROGRESS NOTES
Chief Complaint   Patient presents with   • Sinus Problem   • Nasal Congestion       HPI  Yari Carlson is a 31 y.o. female who presents with sinus pain/pressure, glands are swollen. Started 3 days ago. Has been drinking tea, soup, OTC dayquil & nyquil continues to get worse.      [facility]  The following portions of the patient's history were reviewed and updated as appropriate: allergies, current medications, past family history, past medical history, past social history, past surgical history and problem list.    Past Medical History:   Diagnosis Date   • Abnormal Pap smear of cervix    • Anxiety    • Bacterial vaginosis    • Dysfunctional uterine bleeding    • Headache    • Migraine    • Tobacco use 2/27/2017     Past Surgical History:   Procedure Laterality Date   • ABDOMINAL SURGERY     • TOOTH EXTRACTION       Patient Active Problem List    Diagnosis   • Surveillance for birth control, oral contraceptives [Z30.41]   • Migraine [G43.909]   • Anxiety [F41.9]   • Abnormal Pap smear of cervix [R87.619]   • Routine adult health maintenance [Z00.00]   • Vaginal discharge [N89.8]   • Pap smear, as part of routine gynecological examination [Z01.419]   • Numbness and tingling in both hands [R20.0, R20.2]   • Intractable chronic migraine without aura and without status migrainosus [G43.719]   • Bacterial vaginosis [N76.0, B96.89]   • Chills [R68.83]   • Chronic tension type headache [G44.229]   • Common migraine without aura [G43.009]   • Diverticulitis of colon [K57.32]   • Dysfunctional uterine bleeding [N93.8]   • Dysuria [R30.0]   • Fatigue [R53.83]   • Headache [R51]   • Recurrent cold sores [B00.1]   • Shoulder pain [M25.519]   • Strep pharyngitis [J02.0]   • Vertigo [R42]   • Vitamin D deficiency [E55.9]   • Menorrhagia with irregular cycle [N92.1]     Social History   Substance Use Topics   • Smoking status: Former Smoker     Packs/day: 0.25     Years: 7.00     Types: Cigarettes     Quit date: 6/1/2017   •  "Smokeless tobacco: Never Used   • Alcohol use Yes      Comment: social     Current Outpatient Prescriptions on File Prior to Visit   Medication Sig Dispense Refill   • APAP-isometheptene-dichloral -325 MG per capsule Take 1 capsule by mouth Every 6 (Six) Hours As Needed for Migraine. 60 capsule 2   • ALYSA 0.35 MG tablet TAKE 1 TABLET BY MOUTH DAILY. 28 tablet 9   • promethazine (PHENERGAN) 25 MG tablet Take 1 tablet by mouth Every 6 (Six) Hours As Needed for Nausea or Vomiting. 24 tablet 3   • TROKENDI  MG capsule sustained-release 24 hr Take 100 mg by mouth Every Night. 30 capsule 11   • vitamin B-12 (CYANOCOBALAMIN) 1000 MCG tablet Take 1 tablet by mouth Daily. 30 tablet 11     No current facility-administered medications on file prior to visit.          Review of Systems   Constitutional: Positive for fatigue. Negative for fever.   HENT: Positive for congestion, ear pain (pressure), facial swelling, postnasal drip, rhinorrhea, sinus pressure and sore throat.    Respiratory: Positive for cough (intermittent). Negative for shortness of breath and wheezing.    Cardiovascular: Negative.    Gastrointestinal: Negative.    Skin: Negative.    Neurological: Negative.    Hematological: Positive for adenopathy.   All other systems reviewed and are negative.          Objective   Blood pressure 104/60, pulse 84, temperature 98.6 °F (37 °C), height 68\" (172.7 cm), weight 114 lb (51.7 kg), SpO2 99 %.  Body mass index is 17.33 kg/(m^2).      Physical Exam   Constitutional: She is oriented to person, place, and time. She appears well-developed and well-nourished. She appears ill (mildly). No distress.   HENT:   Head: Normocephalic and atraumatic.   Right Ear: Tympanic membrane and ear canal normal. Tympanic membrane is not erythematous.   Left Ear: Tympanic membrane and ear canal normal. Tympanic membrane is not erythematous.   Nose: Mucosal edema and sinus tenderness present.   Mouth/Throat: Uvula is midline and " mucous membranes are normal. Posterior oropharyngeal erythema present. No oropharyngeal exudate or posterior oropharyngeal edema.   Eyes: Conjunctivae are normal. Pupils are equal, round, and reactive to light.   Neck: Normal range of motion.   Cardiovascular: Normal rate, regular rhythm and normal heart sounds.    Pulmonary/Chest: Effort normal and breath sounds normal. No respiratory distress. She has no wheezes.   Lymphadenopathy:     She has cervical adenopathy.   Neurological: She is alert and oriented to person, place, and time.   Skin: Skin is warm and dry. No rash noted.   Psychiatric: She has a normal mood and affect. Her speech is normal and behavior is normal. Thought content normal. Cognition and memory are normal.             ASSESSMENT/PLAN  1. Acute URI    Symptoms management, provided samples of Stahist AD        Plan of care reviewed with patient at the conclusion of today's visit. Education was provided in regards to diagnosis, management and any prescribed or recommended OTC medications.  Patient verbalizes Understanding of and agreement with management plan.      FOLLOW-UP  Return if symptoms worsen or fail to improve.      Future Appointments  Date Time Provider Department Center   10/25/2017 8:30 AM MD JACKIE Perez O[facility] None   4/9/2018 9:00 AM ELISHA Mendoza N [facility] None         Electronically signed by:  ELISHA Rios  10/20/2017

## 2017-10-25 ENCOUNTER — OFFICE VISIT (OUTPATIENT)
Dept: OBSTETRICS AND GYNECOLOGY | Facility: CLINIC | Age: 32
End: 2017-10-25

## 2017-10-25 VITALS
HEIGHT: 63 IN | WEIGHT: 113.8 LBS | BODY MASS INDEX: 20.16 KG/M2 | SYSTOLIC BLOOD PRESSURE: 102 MMHG | DIASTOLIC BLOOD PRESSURE: 66 MMHG

## 2017-10-25 DIAGNOSIS — N87.0 DYSPLASIA OF CERVIX, LOW GRADE (CIN 1): Primary | ICD-10-CM

## 2017-10-25 PROCEDURE — 99213 OFFICE O/P EST LOW 20 MIN: CPT | Performed by: OBSTETRICS & GYNECOLOGY

## 2017-10-25 NOTE — PROGRESS NOTES
Chief Complaint   Patient presents with   • Follow-up     repeat pap       Yari Carlson is a 31 y.o. year old  presenting to be seen for a PAP in follow-up of a prior abnormal PAP and/or HPV screen. She was initially referred for an abnormal Pap test in .  Her Pap test was read as low-grade NICCI.  On 2016 she underwent colposcopy with directed biopsies consistent with JONNY-1.  She is HPV,-16/18 positive.  A follow-up Pap test on 2017 was again read as low-grade NICCI with HPV positivity.  She returns today for follow-up.  We have previously had an extensive discussion about conservative management of mild dysplasia in young women to attempt to avoid cervical complications.  She understands this plan of management.  She has a long history of irregular menses and is currently treated with Cora oral contraceptives and has periods every 2-3 months.  She has no side effects on oral contraceptives.  She does have a history of migraine headaches.    Her last PAP: 2017 and the result was LGSIL  Her last HPV test: was done on approximately 2017 and the result was:  HPV non/16/18 (+)  Uses tobacco currently: no  Sexually active: yes  Current contraception: OCP (estrogen/progesterone)    No Additional Complaints Reported    The following portions of the patient's history were reviewed and updated as appropriate:vital signs and   She  does not have any pertinent problems on file.  She  has a past surgical history that includes Abdominal surgery and Tooth extraction.  Current Outpatient Prescriptions   Medication Sig Dispense Refill   • APAP-isometheptene-dichloral -325 MG per capsule Take 1 capsule by mouth Every 6 (Six) Hours As Needed for Migraine. 60 capsule 2   • CORA 0.35 MG tablet TAKE 1 TABLET BY MOUTH DAILY. 28 tablet 9   • Chlorcyclizine-Pseudoephed (STAHIST AD) 25-60 MG tablet Take 1 tablet by mouth 3 (Three) Times a Day As Needed (congestion). 21 tablet 0   •  "fluticasone (FLONASE) 50 MCG/ACT nasal spray 2 sprays into each nostril Daily. 1 bottle 0   • promethazine (PHENERGAN) 25 MG tablet Take 1 tablet by mouth Every 6 (Six) Hours As Needed for Nausea or Vomiting. 24 tablet 3   • promethazine-dextromethorphan (PROMETHAZINE-DM) 6.25-15 MG/5ML syrup Take 5 mL by mouth 4 (Four) Times a Day As Needed for Cough. 118 mL 0   • TROKENDI  MG capsule sustained-release 24 hr Take 100 mg by mouth Every Night. 30 capsule 11   • vitamin B-12 (CYANOCOBALAMIN) 1000 MCG tablet Take 1 tablet by mouth Daily. 30 tablet 11     No current facility-administered medications for this visit.      She is allergic to naproxen..    Review of Systems  A comprehensive review of systems was negative.  Constitutional: negative for fever, chills, activity change, appetite change, fatigue and unexpected weight change.  Respiratory: negative  Cardiovascular: negative  Gastrointestinal: negative  Genitourinary:negative  Musculoskeletal:not done  Behavioral/Psych: negative        /66  Ht 63\" (160 cm)  Wt 113 lb 12.8 oz (51.6 kg)  LMP 09/28/2017 (Exact Date)  BMI 20.16 kg/m2    Physical Exam    General:  alert; cooperative; well developed; well nourished   Thyroid: normal to inspection and palpation   Abdomen: soft, non-tender; no masses  no umbilical or inginual hernias are present  no hepato-splenomegaly   Pelvis: Clinical staff was present for exam  External genitalia:  normal appearance of the external genitalia including Bartholin's and Ottertail's glands.  Vaginal:  normal pink mucosa without prolapse or lesions.  Cervix:  normal appearance.  Uterus:  normal size, shape and consistency. anteverted;  Adnexa:  normal bimanual exam of the adnexa.     Lab Review   No data reviewed    Imaging   No data reviewed       ASSESSMENT  Problems Addressed this Visit        Genitourinary    Dysplasia of cervix, low grade (JONNY 1) - Primary    Relevant Orders    Liquid-based Pap Smear, Screening - ThinPrep " Vial, Cervix, Endocervix              PLAN   Pap test done  Follow up: 6 month(s) for wellness visit and Pap test. I have again counseled the patient about conservative management of low-grade NICCI and a young nulliparous female.  She understands this plan of management.  *Please note that portions of this documentation may have been completed with a voice recognition program.  Efforts were made to edit this dictation, but occasional words may have been mistranscribed.  This note was electronically signed.    STORMY Murillo MD  October 25, 2017  9:02 AM

## 2017-11-13 RX ORDER — OMEGA-3/DHA/EPA/FISH OIL 35-113.5MG
TABLET,CHEWABLE ORAL
Qty: 30 TABLET | Refills: 4 | Status: SHIPPED | OUTPATIENT
Start: 2017-11-13 | End: 2021-09-20

## 2017-12-12 ENCOUNTER — TELEPHONE (OUTPATIENT)
Dept: INTERNAL MEDICINE | Facility: CLINIC | Age: 32
End: 2017-12-12

## 2017-12-12 NOTE — TELEPHONE ENCOUNTER
Pt forgot her medication in her car, she is about to board a plane for work and can't run back to her car. . Would really like if you could call in a form of topomax 100 mg a day to get her through the next 2 weeks [facility]   P: [phone]  F: [phone]

## 2017-12-13 NOTE — TELEPHONE ENCOUNTER
Called and spoke with [facility] at the Number listed in the message and they stated they the patient has already had it refilled at another [facility] in [place]

## 2017-12-26 ENCOUNTER — OFFICE VISIT (OUTPATIENT)
Dept: INTERNAL MEDICINE | Facility: CLINIC | Age: 32
End: 2017-12-26

## 2017-12-26 VITALS
BODY MASS INDEX: 21.58 KG/M2 | OXYGEN SATURATION: 99 % | HEART RATE: 108 BPM | TEMPERATURE: 98.9 F | HEIGHT: 63 IN | SYSTOLIC BLOOD PRESSURE: 100 MMHG | WEIGHT: 121.8 LBS | DIASTOLIC BLOOD PRESSURE: 62 MMHG

## 2017-12-26 DIAGNOSIS — R68.89 FLU-LIKE SYMPTOMS: ICD-10-CM

## 2017-12-26 DIAGNOSIS — J02.9 SORE THROAT: Primary | ICD-10-CM

## 2017-12-26 LAB
EXPIRATION DATE: NORMAL
INTERNAL CONTROL: NORMAL
Lab: NORMAL
S PYO AG THROAT QL: NEGATIVE

## 2017-12-26 PROCEDURE — 87880 STREP A ASSAY W/OPTIC: CPT | Performed by: NURSE PRACTITIONER

## 2017-12-26 PROCEDURE — 87804 INFLUENZA ASSAY W/OPTIC: CPT | Performed by: NURSE PRACTITIONER

## 2017-12-26 PROCEDURE — 99213 OFFICE O/P EST LOW 20 MIN: CPT | Performed by: NURSE PRACTITIONER

## 2017-12-26 RX ORDER — OSELTAMIVIR PHOSPHATE 75 MG/1
75 CAPSULE ORAL 2 TIMES DAILY
Qty: 10 CAPSULE | Refills: 0 | Status: SHIPPED | OUTPATIENT
Start: 2017-12-26 | End: 2017-12-31

## 2017-12-26 NOTE — PROGRESS NOTES
Chief Complaint   Patient presents with   • Sore Throat     1 day   • Headache     1 day sinus pressure        HPI  Yari Carlson is a 32 y.o. female who presents with sneezing for a couple days and today feels like death, sore throat,headache, face hurts, neck hurts.  Has not taking any alleviating measures. Has not had flu vaccine this year.      [facility]  The following portions of the patient's history were reviewed and updated as appropriate: allergies, current medications, past family history, past medical history, past social history, past surgical history and problem list.    Past Medical History:   Diagnosis Date   • Abnormal Pap smear of cervix    • Anxiety    • Bacterial vaginosis    • Dysfunctional uterine bleeding    • Headache    • Migraine    • Tobacco use 2/27/2017     Past Surgical History:   Procedure Laterality Date   • ABDOMINAL SURGERY     • TOOTH EXTRACTION       Patient Active Problem List    Diagnosis   • Dysplasia of cervix, low grade (JONNY 1) [N87.0]   • Surveillance for birth control, oral contraceptives [Z30.41]   • Migraine [G43.909]   • Anxiety [F41.9]   • Routine adult health maintenance [Z00.00]   • Numbness and tingling in both hands [R20.0, R20.2]   • Intractable chronic migraine without aura and without status migrainosus [G43.719]   • Chills [R68.83]   • Chronic tension type headache [G44.229]   • Common migraine without aura [G43.009]   • Diverticulitis of colon [K57.32]   • Dysfunctional uterine bleeding [N93.8]   • Dysuria [R30.0]   • Fatigue [R53.83]   • Headache [R51]   • Recurrent cold sores [B00.1]   • Shoulder pain [M25.519]   • Strep pharyngitis [J02.0]   • Vertigo [R42]   • Vitamin D deficiency [E55.9]   • Menorrhagia with irregular cycle [N92.1]     Social History   Substance Use Topics   • Smoking status: Former Smoker     Packs/day: 0.25     Years: 7.00     Types: Cigarettes     Quit date: 6/1/2017   • Smokeless tobacco: Never Used   • Alcohol use Yes       "Comment: social     Current Outpatient Prescriptions on File Prior to Visit   Medication Sig Dispense Refill   • APAP-isometheptene-dichloral -325 MG per capsule Take 1 capsule by mouth Every 6 (Six) Hours As Needed for Migraine. 60 capsule 2   • ALYSA 0.35 MG tablet TAKE 1 TABLET BY MOUTH DAILY. 28 tablet 9   • Chlorcyclizine-Pseudoephed (STAHIST AD) 25-60 MG tablet Take 1 tablet by mouth 3 (Three) Times a Day As Needed (congestion). 21 tablet 0   • [employer] VITAMIN B-12 1000 MCG tablet TAKE 1 TABLET BY MOUTH DAILY. 30 tablet 4   • fluticasone (FLONASE) 50 MCG/ACT nasal spray 2 sprays into each nostril Daily. 1 bottle 0   • promethazine (PHENERGAN) 25 MG tablet Take 1 tablet by mouth Every 6 (Six) Hours As Needed for Nausea or Vomiting. 24 tablet 3   • promethazine-dextromethorphan (PROMETHAZINE-DM) 6.25-15 MG/5ML syrup Take 5 mL by mouth 4 (Four) Times a Day As Needed for Cough. 118 mL 0   • TROKENDI  MG capsule sustained-release 24 hr Take 100 mg by mouth Every Night. 30 capsule 11     No current facility-administered medications on file prior to visit.          Review of Systems   Constitutional: Positive for fatigue.   HENT: Positive for postnasal drip, rhinorrhea, sinus pain, sinus pressure, sneezing and sore throat.    Respiratory: Positive for cough. Negative for shortness of breath and wheezing.    Cardiovascular: Negative.    Gastrointestinal: Negative.    Musculoskeletal: Positive for neck pain. Negative for neck stiffness.   Neurological: Positive for headaches.           Objective   Blood pressure 100/62, pulse 108, temperature 98.9 °F (37.2 °C), height 160 cm (62.99\"), weight 55.2 kg (121 lb 12.8 oz), SpO2 99 %.  Body mass index is 21.58 kg/(m^2).      Physical Exam   Constitutional: She is oriented to person, place, and time. She appears well-developed and well-nourished. She appears ill (mildly). No distress.   HENT:   Head: Normocephalic and atraumatic.   Right Ear: Tympanic membrane and ear " canal normal. Tympanic membrane is not erythematous.   Left Ear: Tympanic membrane and ear canal normal. Tympanic membrane is not erythematous.   Nose: Mucosal edema and sinus tenderness present.   Mouth/Throat: Uvula is midline and mucous membranes are normal. Posterior oropharyngeal erythema present. No oropharyngeal exudate or posterior oropharyngeal edema.   Eyes: Conjunctivae are normal. Pupils are equal, round, and reactive to light.   Neck: Normal range of motion.   Cardiovascular: Normal rate, regular rhythm and normal heart sounds.    Pulmonary/Chest: Effort normal and breath sounds normal. No respiratory distress. She has no wheezes.   Lymphadenopathy:     She has cervical adenopathy.   Neurological: She is alert and oriented to person, place, and time.   Skin: Skin is warm and dry. No rash noted.   Psychiatric: She has a normal mood and affect. Her speech is normal and behavior is normal. Thought content normal. Cognition and memory are normal.       Results for orders placed or performed in visit on 12/26/17   POCT rapid strep A   Result Value Ref Range    Rapid Strep A Screen Negative Negative, VALID, INVALID, Not Performed    Internal Control Passed Passed    Lot Number [id number]     Expiration Date 2019-03-31    POC Influenza A / B   Result Value Ref Range    Rapid Influenza A Ag neg     Rapid Influenza B Ag neg     Internal Control Passed Passed    Lot Number [id number]     Expiration Date 2019          ASSESSMENT/PLAN  1. Sore throat    - POCT rapid strep A    2. Flu-like symptoms  Influenza is working diagnosis given history and local prevalence  - POC Influenza A / B  - oseltamivir (TAMIFLU) 75 MG capsule; Take 1 capsule by mouth 2 (Two) Times a Day for 5 days.  Dispense: 10 capsule; Refill: 0          Plan of care reviewed with patient at the conclusion of today's visit. Education was provided in regards to diagnosis, management and any prescribed or recommended OTC medications.  Patient verbalizes  Understanding of and agreement with management plan.      FOLLOW-UP  Return if symptoms worsen or fail to improve.      Future Appointments  Date Time Provider Department Center   4/9/2018 9:00 AM ELISHA Mendoza N [facility] None   4/18/2018 9:15 AM Moisés Murillo MD [facility] None         Electronically signed by:  ELISHA Rios  12/26/2017

## 2017-12-28 LAB
EXPIRATION DATE: 2019
FLUAV AG NPH QL: NORMAL
FLUBV AG NPH QL: NORMAL
INTERNAL CONTROL: NORMAL
Lab: 1

## 2017-12-29 ENCOUNTER — OFFICE VISIT (OUTPATIENT)
Dept: INTERNAL MEDICINE | Facility: CLINIC | Age: 32
End: 2017-12-29

## 2017-12-29 VITALS
DIASTOLIC BLOOD PRESSURE: 60 MMHG | HEART RATE: 99 BPM | HEIGHT: 63 IN | WEIGHT: 122 LBS | SYSTOLIC BLOOD PRESSURE: 100 MMHG | OXYGEN SATURATION: 95 % | TEMPERATURE: 98.1 F | BODY MASS INDEX: 21.62 KG/M2

## 2017-12-29 DIAGNOSIS — J01.10 ACUTE FRONTAL SINUSITIS, RECURRENCE NOT SPECIFIED: Primary | ICD-10-CM

## 2017-12-29 DIAGNOSIS — R11.0 NAUSEA: ICD-10-CM

## 2017-12-29 PROCEDURE — 99213 OFFICE O/P EST LOW 20 MIN: CPT | Performed by: NURSE PRACTITIONER

## 2017-12-29 RX ORDER — PROMETHAZINE HYDROCHLORIDE 25 MG/1
25 TABLET ORAL EVERY 6 HOURS PRN
Qty: 24 TABLET | Refills: 3 | Status: SHIPPED | OUTPATIENT
Start: 2017-12-29 | End: 2021-06-21 | Stop reason: SDUPTHER

## 2017-12-29 RX ORDER — AMOXICILLIN AND CLAVULANATE POTASSIUM 875; 125 MG/1; MG/1
1 TABLET, FILM COATED ORAL 2 TIMES DAILY
Qty: 20 TABLET | Refills: 0 | Status: SHIPPED | OUTPATIENT
Start: 2017-12-29 | End: 2018-01-08

## 2017-12-29 NOTE — PROGRESS NOTES
Chief Complaint   Patient presents with   • Earache     3 days   • URI     3 days   • Muscle Pain     3 days    • Fatigue     3 days        HPI  Yari Carlson is a 32 y.o. female who presents with sinus pain, frontal, ears hurt,  cough has become productive.  Ws here 2 days ago and negative flu test, but treated on symptoms.      [facility]  The following portions of the patient's history were reviewed and updated as appropriate: allergies, current medications, past family history, past medical history, past social history, past surgical history and problem list.    Past Medical History:   Diagnosis Date   • Abnormal Pap smear of cervix    • Anxiety    • Bacterial vaginosis    • Dysfunctional uterine bleeding    • Headache    • Migraine    • Tobacco use 2/27/2017     Past Surgical History:   Procedure Laterality Date   • ABDOMINAL SURGERY     • TOOTH EXTRACTION       Patient Active Problem List    Diagnosis   • Dysplasia of cervix, low grade (JONNY 1) [N87.0]   • Surveillance for birth control, oral contraceptives [Z30.41]   • Migraine [G43.909]   • Anxiety [F41.9]   • Routine adult health maintenance [Z00.00]   • Numbness and tingling in both hands [R20.0, R20.2]   • Intractable chronic migraine without aura and without status migrainosus [G43.719]   • Chills [R68.83]   • Chronic tension type headache [G44.229]   • Common migraine without aura [G43.009]   • Diverticulitis of colon [K57.32]   • Dysfunctional uterine bleeding [N93.8]   • Dysuria [R30.0]   • Fatigue [R53.83]   • Headache [R51]   • Recurrent cold sores [B00.1]   • Shoulder pain [M25.519]   • Strep pharyngitis [J02.0]   • Vertigo [R42]   • Vitamin D deficiency [E55.9]   • Menorrhagia with irregular cycle [N92.1]     Social History   Substance Use Topics   • Smoking status: Former Smoker     Packs/day: 0.25     Years: 7.00     Types: Cigarettes     Quit date: 6/1/2017   • Smokeless tobacco: Never Used   • Alcohol use Yes      Comment: social  "    Current Outpatient Prescriptions on File Prior to Visit   Medication Sig Dispense Refill   • APAP-isometheptene-dichloral -325 MG per capsule Take 1 capsule by mouth Every 6 (Six) Hours As Needed for Migraine. 60 capsule 2   • ALYSA 0.35 MG tablet TAKE 1 TABLET BY MOUTH DAILY. 28 tablet 9   • Chlorcyclizine-Pseudoephed (STAHIST AD) 25-60 MG tablet Take 1 tablet by mouth 3 (Three) Times a Day As Needed (congestion). 21 tablet 0   • CVS VITAMIN B-12 1000 MCG tablet TAKE 1 TABLET BY MOUTH DAILY. 30 tablet 4   • fluticasone (FLONASE) 50 MCG/ACT nasal spray 2 sprays into each nostril Daily. 1 bottle 0   • oseltamivir (TAMIFLU) 75 MG capsule Take 1 capsule by mouth 2 (Two) Times a Day for 5 days. 10 capsule 0   • promethazine-dextromethorphan (PROMETHAZINE-DM) 6.25-15 MG/5ML syrup Take 5 mL by mouth 4 (Four) Times a Day As Needed for Cough. 118 mL 0   • TROKENDI  MG capsule sustained-release 24 hr Take 100 mg by mouth Every Night. 30 capsule 11   • [DISCONTINUED] promethazine (PHENERGAN) 25 MG tablet Take 1 tablet by mouth Every 6 (Six) Hours As Needed for Nausea or Vomiting. 24 tablet 3     No current facility-administered medications on file prior to visit.          Review of Systems        Objective   Blood pressure 100/60, pulse 99, temperature 98.1 °F (36.7 °C), height 160 cm (62.99\"), weight 55.3 kg (122 lb), SpO2 95 %.  Body mass index is 21.62 kg/(m^2).      Physical Exam   Constitutional: She is oriented to person, place, and time. She appears well-developed and well-nourished. She appears ill.   HENT:   Right Ear: Tympanic membrane is erythematous. Tympanic membrane is not bulging.   Left Ear: Tympanic membrane normal.   Nose: Right sinus exhibits maxillary sinus tenderness and frontal sinus tenderness.   Mouth/Throat: Mucous membranes are normal. Posterior oropharyngeal erythema present. No oropharyngeal exudate or posterior oropharyngeal edema.   Eyes: Conjunctivae are normal.   Neck: Normal " range of motion.   Cardiovascular: Normal rate, regular rhythm and normal heart sounds.    Pulmonary/Chest: Effort normal and breath sounds normal.   Lymphadenopathy:     She has cervical adenopathy (shotty).   Neurological: She is alert and oriented to person, place, and time.   Skin: Skin is warm and dry.   Psychiatric: She has a normal mood and affect. Her behavior is normal.   Nursing note and vitals reviewed.            ASSESSMENT/PLAN  1. Acute frontal sinusitis, recurrence not specified    - amoxicillin-clavulanate (AUGMENTIN) 875-125 MG per tablet; Take 1 tablet by mouth 2 (Two) Times a Day for 10 days.  Dispense: 20 tablet; Refill: 0    2. Nausea    - promethazine (PHENERGAN) 25 MG tablet; Take 1 tablet by mouth Every 6 (Six) Hours As Needed for Nausea or Vomiting.  Dispense: 24 tablet; Refill: 3        Plan of care reviewed with patient at the conclusion of today's visit. Education was provided in regards to diagnosis, management and any prescribed or recommended OTC medications.  Patient verbalizes Understanding of and agreement with management plan.      FOLLOW-UP  Return if symptoms worsen or fail to improve.      Future Appointments  Date Time Provider Department Center   4/9/2018 9:00 AM ELISHA Mendoza N [facility] None   4/18/2018 9:15 AM MD JACKIE Perez [facility]2 None         Electronically signed by:  ELISHA Rios  12/29/2017

## 2018-01-14 RX ORDER — NORETHINDRONE 0.35 MG/1
TABLET ORAL
Qty: 28 TABLET | Refills: 1 | Status: SHIPPED | OUTPATIENT
Start: 2018-01-14 | End: 2018-03-09 | Stop reason: SDUPTHER

## 2018-01-24 ENCOUNTER — TELEPHONE (OUTPATIENT)
Dept: NEUROLOGY | Facility: CLINIC | Age: 33
End: 2018-01-24

## 2018-01-24 DIAGNOSIS — G43.719 INTRACTABLE CHRONIC MIGRAINE WITHOUT AURA AND WITHOUT STATUS MIGRAINOSUS: Primary | ICD-10-CM

## 2018-01-24 RX ORDER — TOPIRAMATE 100 MG/1
100 TABLET, FILM COATED ORAL NIGHTLY
Qty: 30 TABLET | Refills: 2 | Status: SHIPPED | OUTPATIENT
Start: 2018-01-24 | End: 2018-04-16 | Stop reason: SDUPTHER

## 2018-01-24 NOTE — TELEPHONE ENCOUNTER
Sent generic topamax script to pharmacy. Hopefully she will do ok on it since previously she had more side effects versus trokendi xr.

## 2018-01-24 NOTE — TELEPHONE ENCOUNTER
Pt said she could not come to office today because she is flying out today and if it were a perfect world and as easy as that she could have gotten her medication yesterday but no one told her until today she needed a gold card and wants you to send a script in to pharmacy for the topiramate.  I tried to explain it would be free and she said she understood but she doesn't have time to sit around and wait.

## 2018-01-24 NOTE — TELEPHONE ENCOUNTER
[facility] pharmacy called and said that the pt's script for trokendi had been denied and they would like pt to take the topiramate.  Pharmacy said pt is okay with this and would like a script sent to the pharmacy as soon as possible because she is leaving on vacation tomorrow.  Called pt to verify information that pharmacy had supplied.

## 2018-01-24 NOTE — TELEPHONE ENCOUNTER
I called the pt and was not able to lvm to inform pt that Gita had sent in the script to generic topamax to pharmacy

## 2018-01-24 NOTE — TELEPHONE ENCOUNTER
The trokendi is only being denied because it is a new year-with the gold card she can continue to get this for free and has tolerated this medication better. Would she be able to run by our office today and get a gold card and register the gold card so she can get her trokendi for free?? Let me know!

## 2018-02-12 ENCOUNTER — TELEPHONE (OUTPATIENT)
Dept: INTERNAL MEDICINE | Facility: CLINIC | Age: 33
End: 2018-02-12

## 2018-02-12 NOTE — TELEPHONE ENCOUNTER
PATIENT IS OUT OF TOWN AND WANTS STAY HIST BUT IT ISN'T OVER THE COUNTER AND SHE HAS BEEN HAVING ALLERGY SYMPTOMS FOR 3 DAYS.  SHE DIDN'T GO TO AN [facility].  SHE WANTS TO KNOW IF WE CAN CALL IT IN.     [facility]  ADDRESS  [address]    PHONE [phone]

## 2018-02-15 ENCOUNTER — TELEPHONE (OUTPATIENT)
Dept: INTERNAL MEDICINE | Facility: CLINIC | Age: 33
End: 2018-02-15

## 2018-02-15 NOTE — TELEPHONE ENCOUNTER
PHARMACY CALLED IN REGARDS TO THE PATIENT'S STAHIST MEDICATION BEING DISCONTINUED. THEY WILL LIKE FOR MANA TO SEND A NEW RX IN THE PLACE OF IT. PHARMACY CAN BE REACHED [phone]

## 2018-03-04 ENCOUNTER — OFFICE VISIT (OUTPATIENT)
Dept: INTERNAL MEDICINE | Facility: CLINIC | Age: 33
End: 2018-03-04

## 2018-03-04 VITALS
BODY MASS INDEX: 20.02 KG/M2 | DIASTOLIC BLOOD PRESSURE: 64 MMHG | OXYGEN SATURATION: 100 % | HEART RATE: 101 BPM | SYSTOLIC BLOOD PRESSURE: 108 MMHG | WEIGHT: 113 LBS | HEIGHT: 63 IN

## 2018-03-04 DIAGNOSIS — H65.03 BILATERAL ACUTE SEROUS OTITIS MEDIA, RECURRENCE NOT SPECIFIED: ICD-10-CM

## 2018-03-04 DIAGNOSIS — J01.00 ACUTE MAXILLARY SINUSITIS, RECURRENCE NOT SPECIFIED: Primary | ICD-10-CM

## 2018-03-04 PROCEDURE — 99213 OFFICE O/P EST LOW 20 MIN: CPT | Performed by: NURSE PRACTITIONER

## 2018-03-04 RX ORDER — FLUTICASONE PROPIONATE 50 MCG
2 SPRAY, SUSPENSION (ML) NASAL DAILY
Qty: 1 BOTTLE | Refills: 0 | Status: SHIPPED | OUTPATIENT
Start: 2018-03-04 | End: 2018-04-05 | Stop reason: SDUPTHER

## 2018-03-04 RX ORDER — AMOXICILLIN AND CLAVULANATE POTASSIUM 875; 125 MG/1; MG/1
1 TABLET, FILM COATED ORAL EVERY 12 HOURS SCHEDULED
Qty: 20 TABLET | Refills: 0 | Status: SHIPPED | OUTPATIENT
Start: 2018-03-04 | End: 2021-06-21

## 2018-03-04 RX ORDER — FLUCONAZOLE 150 MG/1
TABLET ORAL
Qty: 1 TABLET | Refills: 0 | OUTPATIENT
Start: 2018-03-04 | End: 2021-07-12

## 2018-03-04 NOTE — PROGRESS NOTES
CHIEF COMPLAINT  Chief Complaint   Patient presents with   • Earache     left ear. pt had a sinus infection a couple of weeks ago and was given a z-pack but didnt really help   • Cough       HPI  Yari Carlson is a 32 y.o. female  presents with complaint of bilateral ear pain with left ear hurting worse; prod cough with yellowish/clear sputum; migraines (hx of), congestion with yellowish/clear d/c, continued facial pain/pressure, PND; denies sore throat, body aches, fever; she has been taking Stahist for symptoms.      Past Medical History:   Diagnosis Date   • Abnormal Pap smear of cervix    • Anxiety    • Bacterial vaginosis    • Dysfunctional uterine bleeding    • Headache    • Migraine    • Tobacco use 2/27/2017       Family History   Problem Relation Age of Onset   • Thyroid disease Mother    • Migraines Mother    • Diabetes Maternal Grandmother    • Cancer Maternal Grandmother    • Heart disease Maternal Grandmother    • Hyperlipidemia Maternal Grandmother    • Obesity Maternal Grandmother    • Diabetes Maternal Grandfather    • Migraines Maternal Grandfather    • Diabetes Paternal Grandmother    • Cancer Paternal Grandmother    • Diabetes Paternal Grandfather      type 1   • Arthritis Paternal Aunt      rheumatoid   • Lupus Paternal Aunt    • Other Other      cardiac disorder   • Stroke Other    • Diabetes Other    • Cancer Other        Social History     Social History   • Marital status: Single     Spouse name: N/A   • Number of children: N/A   • Years of education: N/A     Occupational History   • Not on file.     Social History Main Topics   • Smoking status: Former Smoker     Packs/day: 0.25     Years: 7.00     Types: Cigarettes     Quit date: 6/1/2017   • Smokeless tobacco: Never Used   • Alcohol use Yes      Comment: social   • Drug use: No   • Sexual activity: Yes     Partners: Male     Birth control/ protection: OCP     Other Topics Concern   • Not on file     Social History Narrative   • No  "narrative on file       ROS  Review of Systems   Constitutional: Positive for fatigue. Negative for activity change, appetite change and fever.   HENT: Positive for congestion, ear pain and postnasal drip. Negative for sinus pain, sinus pressure and sore throat.    Respiratory: Positive for cough.    Neurological: Positive for dizziness and headaches.   All other systems reviewed and are negative.      /64   Pulse 101   Ht 160 cm (63\")   Wt 51.3 kg (113 lb)   SpO2 100%   BMI 20.02 kg/m²     PHYSICAL EXAM  Physical Exam   Constitutional: She is oriented to person, place, and time. She appears well-developed and well-nourished.   HENT:   Head: Normocephalic and atraumatic.   Right Ear: External ear and ear canal normal. Tympanic membrane is bulging. Tympanic membrane is not erythematous. A middle ear effusion (moderate clear effusions bilaterally; right TM is dull) is present.   Left Ear: External ear and ear canal normal. Tympanic membrane is bulging. Tympanic membrane is not erythematous. A middle ear effusion is present.   Nose: Mucosal edema present. Right sinus exhibits maxillary sinus tenderness (severe bilaterally) and frontal sinus tenderness. Left sinus exhibits maxillary sinus tenderness and frontal sinus tenderness.   Mouth/Throat: Uvula is midline. Posterior oropharyngeal erythema (moderate erythema; moderate PND) present. No oropharyngeal exudate or posterior oropharyngeal edema.   Eyes: Conjunctivae are normal.   Neck: Normal range of motion. Neck supple.   Cardiovascular: Normal rate, regular rhythm and normal heart sounds.    No murmur heard.  Pulmonary/Chest: Effort normal and breath sounds normal.   Lymphadenopathy:     She has cervical adenopathy (bilateral ant cervical node enlargement with tenderness).        Right cervical: No posterior cervical adenopathy present.       Left cervical: No posterior cervical adenopathy present.   Neurological: She is alert and oriented to person, place, " and time.   Skin: Skin is warm, dry and intact.   Vitals reviewed.        ASSESSMENT/PLAN  1. Acute maxillary sinusitis, recurrence not specified  - amoxicillin-clavulanate (AUGMENTIN) 875-125 MG per tablet; Take 1 tablet by mouth Every 12 (Twelve) Hours.  Dispense: 20 tablet; Refill: 0  - fluconazole (DIFLUCAN) 150 MG tablet; 1st tab now, 2nd tab on day 5  Dispense: 1 tablet; Refill: 0    2. Bilateral acute serous otitis media, recurrence not specified  - fluticasone (FLONASE) 50 MCG/ACT nasal spray; 2 sprays into each nostril Daily.  Dispense: 1 bottle; Refill: 0      FOLLOW-UP  Next schedule f/u with ELISHA Caballero    RTC sooner as needed.    Patient verbalizes understanding of medication dosage, comfort measures, instructions for treatment and follow-up.    ELISHA Tyler  03/04/2018

## 2018-03-09 RX ORDER — ACETAMINOPHEN AND CODEINE PHOSPHATE 120; 12 MG/5ML; MG/5ML
SOLUTION ORAL
Qty: 84 TABLET | Refills: 1 | Status: SHIPPED | OUTPATIENT
Start: 2018-03-09 | End: 2021-06-21 | Stop reason: SDUPTHER

## 2018-03-09 NOTE — TELEPHONE ENCOUNTER
Received fax from [facility] pharmacy requesting a 90 day refill for Cora 0.35 mg tab, refilled electronically pt is within protocol.

## 2018-04-05 DIAGNOSIS — H65.03 BILATERAL ACUTE SEROUS OTITIS MEDIA, RECURRENCE NOT SPECIFIED: ICD-10-CM

## 2018-04-05 RX ORDER — FLUTICASONE PROPIONATE 50 MCG
2 SPRAY, SUSPENSION (ML) NASAL DAILY
Qty: 16 ML | Refills: 0 | Status: SHIPPED | OUTPATIENT
Start: 2018-04-05 | End: 2018-05-19 | Stop reason: SDUPTHER

## 2018-04-16 DIAGNOSIS — G43.719 INTRACTABLE CHRONIC MIGRAINE WITHOUT AURA AND WITHOUT STATUS MIGRAINOSUS: Primary | ICD-10-CM

## 2018-04-16 RX ORDER — TOPIRAMATE 100 MG/1
100 TABLET, FILM COATED ORAL NIGHTLY
Qty: 90 TABLET | Refills: 0 | Status: SHIPPED | OUTPATIENT
Start: 2018-04-16 | End: 2018-08-11 | Stop reason: SDUPTHER

## 2018-05-19 DIAGNOSIS — H65.03 BILATERAL ACUTE SEROUS OTITIS MEDIA, RECURRENCE NOT SPECIFIED: ICD-10-CM

## 2018-05-19 RX ORDER — FLUTICASONE PROPIONATE 50 MCG
SPRAY, SUSPENSION (ML) NASAL
Qty: 16 ML | Refills: 0 | Status: SHIPPED | OUTPATIENT
Start: 2018-05-19 | End: 2021-09-13

## 2018-06-04 ENCOUNTER — TELEPHONE (OUTPATIENT)
Dept: OBSTETRICS AND GYNECOLOGY | Facility: CLINIC | Age: 33
End: 2018-06-04

## 2018-06-04 NOTE — TELEPHONE ENCOUNTER
Yari cancelled her appointment with Dr. Murillo 4/18/18 and has not rescheduled.  She has been followed for abnormal pap tests, and is now overdue to be seen.  I attempted to contact her today to discuss this situation/reschedule her appointment.  Her voicemail is full and can not accept new messages.

## 2018-08-11 DIAGNOSIS — G43.719 INTRACTABLE CHRONIC MIGRAINE WITHOUT AURA AND WITHOUT STATUS MIGRAINOSUS: ICD-10-CM

## 2018-08-13 RX ORDER — TOPIRAMATE 100 MG/1
TABLET, FILM COATED ORAL
Qty: 90 TABLET | Refills: 0 | Status: SHIPPED | OUTPATIENT
Start: 2018-08-13 | End: 2021-07-09 | Stop reason: SDUPTHER

## 2018-08-13 NOTE — TELEPHONE ENCOUNTER
Patient has not been seen since October 2017. She needs to be seen by October 2018. I will send in 1 time refill but will need to come in for any future refills. Please contact patient to schedule f/u appt. thanks

## 2018-08-29 RX ORDER — ACETAMINOPHEN AND CODEINE PHOSPHATE 120; 12 MG/5ML; MG/5ML
SOLUTION ORAL
Qty: 84 TABLET | Refills: 0 | OUTPATIENT
Start: 2018-08-29

## 2019-07-30 DIAGNOSIS — G43.719 INTRACTABLE CHRONIC MIGRAINE WITHOUT AURA AND WITHOUT STATUS MIGRAINOSUS: ICD-10-CM

## 2019-07-30 RX ORDER — TOPIRAMATE 100 MG/1
TABLET, FILM COATED ORAL
Qty: 90 TABLET | Refills: 0 | OUTPATIENT
Start: 2019-07-30

## 2020-06-26 ENCOUNTER — TELEPHONE (OUTPATIENT)
Dept: NEUROLOGY | Facility: CLINIC | Age: 35
End: 2020-06-26

## 2020-06-26 NOTE — TELEPHONE ENCOUNTER
Either patient can be scheduled for a Video MyChart follow up to discuss her concerns OR she would need to establish care with a local Neurologist or Primary Care Provider where she is now living. She was last seen on 10/9/2017 and has never returned for follow up of visit with us. Her mychart is not currently active, so she would need directions for how to activate this. Thanks, ELISHA Fowler

## 2020-06-26 NOTE — TELEPHONE ENCOUNTER
PT WAS A PT OF LESLIE GARNER IS 2018 , PT IS CURRENTLY LIVING IN [place] , SHE REQUEST THAT SHE SPEAK TO LESLIE ABOUT SOME THINGS IN HER MEDICAL PAST.     PLEASE ADVISE  [phone]  DAVID CERVANTES

## 2020-06-30 ENCOUNTER — TELEPHONE (OUTPATIENT)
Dept: NEUROLOGY | Facility: CLINIC | Age: 35
End: 2020-06-30

## 2020-06-30 NOTE — TELEPHONE ENCOUNTER
I called and spoke by phone with patient's mother Sal.  The patient is seeing the neurologist in [place], [place] and unfortunately the first available follow-up appointment she has been told is October 2020.  The patient has tried and failed Topamax, atenolol and amitriptyline.  The mom tells me that they took her off Topamax due to gallstones.  The patient has been using triptans, Midrin in the past and Fioricet.  Unfortunately she is at the point of not even being functional or able to do her laundry or cleaning her apartment due to her severe migraines and feeling so ill. She reports PCP sent in new triptan-replax which has not helped.     I have recommended that the patient go to the emergency room for treatment of her intractable migraine and possible admission if they feel this is appropriate and follow up with local neurologist.      I also recommend that they contact her local neurology office and request a possible sooner appointment with either PA or nurse practitioner or another provider.  I recommended that she call them and discuss these concerns and symptoms since she is now out of state and has not been seen in over 2 years. Mother Sal verbalizes understanding and agrees with plan.

## 2020-06-30 NOTE — TELEPHONE ENCOUNTER
Received call from Patients mother Gabi and she stated that patient has moved to [facility] and has seen another neurologist Dr Antony Dumont and he has changed all of her medication. She was on Topamax and now she is on Atenolol and nothing is helping and the patient is having headaches and it is hard to work and function. Think the reason they took her off of Topomax due to thinking it caused her gallstones.     Patient has not been in office since 4/2018.     Mother is asking if it would be possible to complete a referral to someone in [place] or if there is anything we can do.       Can someone please contact [phone] Gabi?       Thank you

## 2021-06-21 ENCOUNTER — OFFICE VISIT (OUTPATIENT)
Dept: INTERNAL MEDICINE | Facility: CLINIC | Age: 36
End: 2021-06-21

## 2021-06-21 VITALS
WEIGHT: 123.6 LBS | BODY MASS INDEX: 21.9 KG/M2 | DIASTOLIC BLOOD PRESSURE: 62 MMHG | RESPIRATION RATE: 16 BRPM | TEMPERATURE: 98.2 F | SYSTOLIC BLOOD PRESSURE: 118 MMHG | HEIGHT: 63 IN | OXYGEN SATURATION: 98 % | HEART RATE: 116 BPM

## 2021-06-21 DIAGNOSIS — R11.0 NAUSEA: ICD-10-CM

## 2021-06-21 DIAGNOSIS — Z30.41 SURVEILLANCE FOR BIRTH CONTROL, ORAL CONTRACEPTIVES: ICD-10-CM

## 2021-06-21 DIAGNOSIS — B36.0 TINEA VERSICOLOR: Primary | ICD-10-CM

## 2021-06-21 PROCEDURE — 99203 OFFICE O/P NEW LOW 30 MIN: CPT | Performed by: NURSE PRACTITIONER

## 2021-06-21 RX ORDER — ACETAMINOPHEN AND CODEINE PHOSPHATE 120; 12 MG/5ML; MG/5ML
SOLUTION ORAL
Qty: 84 TABLET | Refills: 1 | Status: SHIPPED | OUTPATIENT
Start: 2021-06-21 | End: 2022-05-20 | Stop reason: SDUPTHER

## 2021-06-21 RX ORDER — KETOCONAZOLE 20 MG/ML
SHAMPOO TOPICAL 2 TIMES WEEKLY
Qty: 120 ML | Refills: 1 | Status: SHIPPED | OUTPATIENT
Start: 2021-06-21 | End: 2021-09-13

## 2021-06-21 RX ORDER — KETOCONAZOLE 200 MG/1
200 TABLET ORAL DAILY
Qty: 14 TABLET | Refills: 0 | Status: SHIPPED | OUTPATIENT
Start: 2021-06-21 | End: 2021-07-05

## 2021-06-21 RX ORDER — PROMETHAZINE HYDROCHLORIDE 25 MG/1
25 TABLET ORAL EVERY 6 HOURS PRN
Qty: 24 TABLET | Refills: 3 | Status: SHIPPED | OUTPATIENT
Start: 2021-06-21 | End: 2021-09-13

## 2021-07-09 ENCOUNTER — PATIENT MESSAGE (OUTPATIENT)
Dept: INTERNAL MEDICINE | Facility: CLINIC | Age: 36
End: 2021-07-09

## 2021-07-09 DIAGNOSIS — G43.719 INTRACTABLE CHRONIC MIGRAINE WITHOUT AURA AND WITHOUT STATUS MIGRAINOSUS: ICD-10-CM

## 2021-07-09 RX ORDER — TOPIRAMATE 100 MG/1
TABLET, FILM COATED ORAL
Qty: 90 TABLET | Refills: 0 | OUTPATIENT
Start: 2021-07-09

## 2021-07-09 RX ORDER — TOPIRAMATE 100 MG/1
100 TABLET, FILM COATED ORAL DAILY
Qty: 90 TABLET | Refills: 0 | Status: SHIPPED | OUTPATIENT
Start: 2021-07-09 | End: 2021-09-13

## 2021-07-09 NOTE — TELEPHONE ENCOUNTER
From: Yari Carlson  To: ELISHA Cohen  Sent: 7/9/2021 10:44 AM EDT  Subject: Prescription Question    Can you please send my refill for my topiramate to my pharmacy at the [facility]? [facility] I am out. I apologize for the inconvenience. Thank you.

## 2021-07-09 NOTE — TELEPHONE ENCOUNTER
Patient has not been seen since 2017. I cannot authorize any refills. She will need to contact her PCP to refill. She would need a follow up for any refills with our neurology clinic per [employer] policy. Thanks, ELISHA Fowler    Please notify patient.

## 2021-07-12 PROCEDURE — 87086 URINE CULTURE/COLONY COUNT: CPT | Performed by: NURSE PRACTITIONER

## 2021-07-12 PROCEDURE — 87491 CHLMYD TRACH DNA AMP PROBE: CPT | Performed by: NURSE PRACTITIONER

## 2021-07-12 PROCEDURE — 87591 N.GONORRHOEAE DNA AMP PROB: CPT | Performed by: NURSE PRACTITIONER

## 2021-07-12 PROCEDURE — 87661 TRICHOMONAS VAGINALIS AMPLIF: CPT | Performed by: NURSE PRACTITIONER

## 2021-07-14 ENCOUNTER — TELEPHONE (OUTPATIENT)
Dept: URGENT CARE | Facility: CLINIC | Age: 36
End: 2021-07-14

## 2021-07-14 ENCOUNTER — TELEPHONE (OUTPATIENT)
Dept: INTERNAL MEDICINE | Facility: CLINIC | Age: 36
End: 2021-07-14

## 2021-07-14 NOTE — TELEPHONE ENCOUNTER
Please let her know it would be best for her to come in a get a swab so we can see what is going on.

## 2021-07-14 NOTE — TELEPHONE ENCOUNTER
PATIENT SAID THAT SHE HAS BEEN FATIGUED LATELY AND SHE IS HAVING A DARK GREY VAGINAL DISCHARGE. SHE IS CONCERNED AND SHE WOULD LIKE TO HAVE SOMEONE CALL HER BACK.    CONTACT: [phone]

## 2021-07-14 NOTE — TELEPHONE ENCOUNTER
HUB TO READ LVM for Pt to return call so she can get scheduled for a gynecological exam. Office number given, please schedule

## 2021-07-15 ENCOUNTER — TELEPHONE (OUTPATIENT)
Dept: URGENT CARE | Facility: CLINIC | Age: 36
End: 2021-07-15

## 2021-07-16 ENCOUNTER — TELEPHONE (OUTPATIENT)
Dept: URGENT CARE | Facility: CLINIC | Age: 36
End: 2021-07-16

## 2021-07-16 ENCOUNTER — LAB (OUTPATIENT)
Dept: LAB | Facility: HOSPITAL | Age: 36
End: 2021-07-16

## 2021-07-16 ENCOUNTER — OFFICE VISIT (OUTPATIENT)
Dept: INTERNAL MEDICINE | Facility: CLINIC | Age: 36
End: 2021-07-16

## 2021-07-16 VITALS
WEIGHT: 118 LBS | HEART RATE: 119 BPM | HEIGHT: 68 IN | DIASTOLIC BLOOD PRESSURE: 78 MMHG | RESPIRATION RATE: 14 BRPM | SYSTOLIC BLOOD PRESSURE: 120 MMHG | TEMPERATURE: 98.6 F | BODY MASS INDEX: 17.88 KG/M2 | OXYGEN SATURATION: 98 %

## 2021-07-16 DIAGNOSIS — B96.89 BACTERIAL VAGINITIS: Primary | ICD-10-CM

## 2021-07-16 DIAGNOSIS — N76.0 BACTERIAL VAGINITIS: Primary | ICD-10-CM

## 2021-07-16 DIAGNOSIS — B96.89 BACTERIAL VAGINITIS: ICD-10-CM

## 2021-07-16 DIAGNOSIS — R30.0 DYSURIA: ICD-10-CM

## 2021-07-16 DIAGNOSIS — N76.0 BACTERIAL VAGINITIS: ICD-10-CM

## 2021-07-16 LAB
BILIRUB BLD-MCNC: NEGATIVE MG/DL
CLARITY, POC: CLEAR
COLOR UR: YELLOW
GLUCOSE UR STRIP-MCNC: ABNORMAL MG/DL
KETONES UR QL: NEGATIVE
LEUKOCYTE EST, POC: NEGATIVE
NITRITE UR-MCNC: NEGATIVE MG/ML
PH UR: 6 [PH] (ref 5–8)
PROT UR STRIP-MCNC: NEGATIVE MG/DL
RBC # UR STRIP: NEGATIVE /UL
SP GR UR: 1.01 (ref 1–1.03)
UROBILINOGEN UR QL: NORMAL

## 2021-07-16 PROCEDURE — 99213 OFFICE O/P EST LOW 20 MIN: CPT | Performed by: NURSE PRACTITIONER

## 2021-07-16 PROCEDURE — 87086 URINE CULTURE/COLONY COUNT: CPT

## 2021-07-16 PROCEDURE — 87798 DETECT AGENT NOS DNA AMP: CPT

## 2021-07-16 PROCEDURE — 87591 N.GONORRHOEAE DNA AMP PROB: CPT

## 2021-07-16 PROCEDURE — 81003 URINALYSIS AUTO W/O SCOPE: CPT | Performed by: NURSE PRACTITIONER

## 2021-07-16 PROCEDURE — 87491 CHLMYD TRACH DNA AMP PROBE: CPT

## 2021-07-16 PROCEDURE — 87661 TRICHOMONAS VAGINALIS AMPLIF: CPT

## 2021-07-16 PROCEDURE — 87801 DETECT AGNT MULT DNA AMPLI: CPT

## 2021-07-16 RX ORDER — FLUCONAZOLE 150 MG/1
150 TABLET ORAL DAILY
Qty: 7 TABLET | Refills: 2 | Status: SHIPPED | OUTPATIENT
Start: 2021-07-16 | End: 2021-09-13

## 2021-07-16 RX ORDER — METRONIDAZOLE 500 MG/1
500 TABLET ORAL 2 TIMES DAILY
Qty: 14 TABLET | Refills: 0 | Status: SHIPPED | OUTPATIENT
Start: 2021-07-16 | End: 2021-07-23

## 2021-07-17 ENCOUNTER — TELEPHONE (OUTPATIENT)
Dept: URGENT CARE | Facility: CLINIC | Age: 36
End: 2021-07-17

## 2021-07-17 LAB — BACTERIA SPEC AEROBE CULT: NORMAL

## 2021-07-20 LAB
A VAGINAE DNA VAG QL NAA+PROBE: NORMAL SCORE
BVAB2 DNA VAG QL NAA+PROBE: NORMAL SCORE
C ALBICANS DNA VAG QL NAA+PROBE: NEGATIVE
C GLABRATA DNA VAG QL NAA+PROBE: NEGATIVE
C TRACH DNA VAG QL NAA+PROBE: NEGATIVE
MEGA1 DNA VAG QL NAA+PROBE: NORMAL SCORE
N GONORRHOEA DNA VAG QL NAA+PROBE: NEGATIVE
T VAGINALIS DNA VAG QL NAA+PROBE: NEGATIVE

## 2021-07-21 ENCOUNTER — TELEPHONE (OUTPATIENT)
Dept: INTERNAL MEDICINE | Facility: CLINIC | Age: 36
End: 2021-07-21

## 2021-07-21 NOTE — PROGRESS NOTES
Please let her know her swab came back normal. This could be where she was treated previously, but continue treatment and follow up as needed.

## 2021-07-21 NOTE — TELEPHONE ENCOUNTER
Caller: Yari Carlson    Relationship: Self    Best call back number: [phone]    What is the best time to reach you: EVENINGS    Who are you requesting to speak with (clinical staff, provider,  specific staff member): CLINICAL STAFF        What was the call regarding:PATIENT RECEIVED A CALL 07/21/2021 ABOUT TEST RESULTS SHE IS ATTEMPTING TO RETURN THE PHONE CALL    Do you require a callback: YES

## 2021-08-03 ENCOUNTER — PATIENT MESSAGE (OUTPATIENT)
Dept: INTERNAL MEDICINE | Facility: CLINIC | Age: 36
End: 2021-08-03

## 2021-08-05 ENCOUNTER — CLINICAL SUPPORT (OUTPATIENT)
Dept: INTERNAL MEDICINE | Facility: CLINIC | Age: 36
End: 2021-08-05

## 2021-08-05 DIAGNOSIS — Z23 NEED FOR HPV VACCINATION: Primary | ICD-10-CM

## 2021-08-05 PROCEDURE — 90649 4VHPV VACCINE 3 DOSE IM: CPT | Performed by: NURSE PRACTITIONER

## 2021-08-05 PROCEDURE — 90471 IMMUNIZATION ADMIN: CPT | Performed by: NURSE PRACTITIONER

## 2021-08-05 NOTE — PROGRESS NOTES
Immunization  Immunization performed in [facility] by Teresita Hernández MA. Patient tolerated the procedure well without complications.  08/05/21   Teresita Hernández MA

## 2021-08-06 NOTE — TELEPHONE ENCOUNTER
From: Yari Carlson  To: ELISHA Cohen  Sent: 8/3/2021 1:15 PM EDT  Subject: Prescription Question    Good afternoon I was wanting to inquire about being able to start the HPV Vaccine series for my age and then I also wanted to see if I could start chantix to quit smoking.

## 2021-09-13 ENCOUNTER — OFFICE VISIT (OUTPATIENT)
Dept: NEUROLOGY | Facility: CLINIC | Age: 36
End: 2021-09-13

## 2021-09-13 VITALS
WEIGHT: 116 LBS | DIASTOLIC BLOOD PRESSURE: 66 MMHG | HEART RATE: 99 BPM | OXYGEN SATURATION: 99 % | HEIGHT: 68 IN | BODY MASS INDEX: 17.58 KG/M2 | SYSTOLIC BLOOD PRESSURE: 116 MMHG

## 2021-09-13 DIAGNOSIS — R11.0 NAUSEA: ICD-10-CM

## 2021-09-13 DIAGNOSIS — G43.719 INTRACTABLE CHRONIC MIGRAINE WITHOUT AURA AND WITHOUT STATUS MIGRAINOSUS: Primary | ICD-10-CM

## 2021-09-13 PROCEDURE — 99214 OFFICE O/P EST MOD 30 MIN: CPT | Performed by: NURSE PRACTITIONER

## 2021-09-13 RX ORDER — PROMETHAZINE HYDROCHLORIDE 25 MG/1
25 TABLET ORAL EVERY 6 HOURS PRN
Qty: 24 TABLET | Refills: 3 | Status: SHIPPED | OUTPATIENT
Start: 2021-09-13 | End: 2022-03-14 | Stop reason: SDUPTHER

## 2021-09-13 RX ORDER — TOPIRAMATE 100 MG/1
100 TABLET, FILM COATED ORAL DAILY
Qty: 90 TABLET | Refills: 3 | Status: SHIPPED | OUTPATIENT
Start: 2021-09-13 | End: 2022-03-14 | Stop reason: SDUPTHER

## 2021-09-13 RX ORDER — FOLIC ACID 1 MG/1
1 TABLET ORAL NIGHTLY
Qty: 90 TABLET | Refills: 3 | Status: SHIPPED | OUTPATIENT
Start: 2021-09-13 | End: 2022-03-14

## 2021-09-13 NOTE — PROGRESS NOTES
Subjective:     Patient ID: Yari Carlson is a 35 y.o. female.    CC:   Chief Complaint   Patient presents with   • Migraine       HPI:   History of Present Illness   This is a 35-year-old female who presents to re-establish care with our neurology clinic for chronic migraine headaches which have been present since age 6 years old.  She was last seen in our clinic in 2017.  She has been living in [place], [place] and was under the care of neurology there.  She has relocated back to [place], [place].  She tells me while in [place] she was treated with Ajovy, Emgality and Aimovig and all 3 of these injections caused severe side effects including vertigo and severe anxiety.  She was also prescribed Ubrelvy which also caused her severe anxiety and she was unable to take this medication.  She was also treated with a beta-blocker which worsened her migraines.  She previously was treated with amitriptyline which was not tolerated.  She is back on her topiramate 100 mg daily which she previously took for several years.  She does not want to change any of her medications at this time.  She does use Advil dual action as needed.  She uses Phenergan as needed for nausea.  She has tried reportedly every triptan made and has found none of these helpful for her migraines.  She is used sumatriptan, rizatriptan, naratriptan, eletriptan, almotriptan, rizatriptan with intolerance or no relief. She is experiencing 15/30 headache days a month with 8/30 migraine days a month for many years. Her migraines occur on the right frontal and temporal area, throbbing, nausea, occasional vomiting, photophobia and phonophobia and last 5-24 hours.  Midrin worked for her but is no longer available. Her migraines are unchanged. She is hesitant to try Botox injections which is something she has not tried. She does not want to make changes right now. Her mom has had migraines since a teenager and takes topiramate 100mg twice a day. She  denies any vision loss or unilateral weakness or other concerning symptoms.  She has never had an MRI of her brain. She does not plan to become pregnant. She is on OCPs.    The following portions of the patient's history were reviewed and updated as appropriate: allergies, current medications, past family history, past medical history, past social history, past surgical history and problem list.    Past Medical History:   Diagnosis Date   • Abnormal Pap smear of cervix    • Anxiety    • Bacterial vaginosis    • Dysfunctional uterine bleeding    • Headache    • Migraine    • Migraine    • Tobacco use 2017       Past Surgical History:   Procedure Laterality Date   • ABDOMINAL SURGERY     • GALLBLADDER SURGERY  2020   • TOOTH EXTRACTION         Social History     Socioeconomic History   • Marital status: Single     Spouse name: Not on file   • Number of children: Not on file   • Years of education: Not on file   • Highest education level: Not on file   Tobacco Use   • Smoking status: Current Every Day Smoker     Packs/day: 0.25     Years: 7.00     Pack years: 1.75     Types: Cigarettes     Last attempt to quit: 2017     Years since quittin.2   • Smokeless tobacco: Never Used   • Tobacco comment: chantix can not be filled due to recall   Vaping Use   • Vaping Use: Never used   Substance and Sexual Activity   • Alcohol use: Yes     Comment: social   • Drug use: No   • Sexual activity: Yes     Partners: Male     Birth control/protection: OCP       Family History   Problem Relation Age of Onset   • Thyroid disease Mother    • Migraines Mother    • Diabetes Maternal Grandmother    • Cancer Maternal Grandmother    • Heart disease Maternal Grandmother    • Hyperlipidemia Maternal Grandmother    • Obesity Maternal Grandmother    • Diabetes Maternal Grandfather    • Migraines Maternal Grandfather    • Diabetes Paternal Grandmother    • Cancer Paternal Grandmother    • Diabetes Paternal Grandfather         type 1  "  • Arthritis Paternal Aunt         rheumatoid   • Lupus Paternal Aunt    • Other Other         cardiac disorder   • Stroke Other    • Diabetes Other    • Cancer Other         Review of Systems   Constitutional: Negative for chills, fatigue, fever and unexpected weight change.   HENT: Negative for ear pain, hearing loss, nosebleeds, rhinorrhea and sore throat.    Eyes: Negative for photophobia, pain, discharge, itching and visual disturbance.   Respiratory: Negative for cough, chest tightness, shortness of breath and wheezing.    Cardiovascular: Negative for chest pain, palpitations and leg swelling.   Gastrointestinal: Negative for abdominal pain, blood in stool, constipation, diarrhea, nausea and vomiting.   Genitourinary: Negative for dysuria, frequency, hematuria and urgency.   Musculoskeletal: Negative for arthralgias, back pain, gait problem, joint swelling, myalgias, neck pain and neck stiffness.   Skin: Negative for rash and wound.   Allergic/Immunologic: Negative for environmental allergies and food allergies.   Neurological: Positive for headaches. Negative for dizziness, tremors, seizures, syncope, speech difficulty, weakness, light-headedness and numbness.   Hematological: Negative for adenopathy. Does not bruise/bleed easily.   Psychiatric/Behavioral: Negative for agitation, confusion, decreased concentration, hallucinations, sleep disturbance and suicidal ideas. The patient is not nervous/anxious.    All other systems reviewed and are negative.       Objective:  /66   Pulse 99   Ht 172.7 cm (68\")   Wt 52.6 kg (116 lb)   SpO2 99%   BMI 17.64 kg/m²     Neurologic Exam     Mental Status   Oriented to person, place, and time.   Registration: recalls 3 of 3 objects. Recall at 5 minutes: recalls 3 of 3 objects. Follows 3 step commands.   Attention: normal. Concentration: normal.   Speech: speech is normal   Level of consciousness: alert  Knowledge: good and consistent with education. Able to " perform simple calculations.   Able to name object. Able to read. Able to repeat. Able to write. Normal comprehension.     Cranial Nerves   Cranial nerves II through XII intact.     Motor Exam   Muscle bulk: normal  Overall muscle tone: normal    Strength   Strength 5/5 throughout.     Sensory Exam   Light touch normal.   Vibration normal.   Proprioception normal.   Pinprick normal.     Gait, Coordination, and Reflexes     Gait  Gait: normal    Coordination   Romberg: negative  Finger to nose coordination: normal  Heel to shin coordination: normal    Tremor   Resting tremor: absent  Intention tremor: absent  Action tremor: absent    Reflexes   Right brachioradialis: 2+  Left brachioradialis: 2+  Right biceps: 2+  Left biceps: 2+  Right triceps: 2+  Left triceps: 2+  Right patellar: 2+  Left patellar: 2+  Right achilles: 2+  Left achilles: 2+  Right : 2+  Left : 2+  Right plantar: normal  Left plantar: normal  Right Dubois: absent  Left Dubois: absent  Right ankle clonus: absent  Left ankle clonus: absent      Physical Exam  Constitutional:       Appearance: Normal appearance.   Cardiovascular:      Rate and Rhythm: Normal rate and regular rhythm.      Heart sounds: Normal heart sounds, S1 normal and S2 normal.   Pulmonary:      Effort: Pulmonary effort is normal.      Breath sounds: Normal breath sounds.   Neurological:      Mental Status: She is alert and oriented to person, place, and time.      Coordination: Finger-Nose-Finger Test, Heel to Shin Test and Romberg Test normal.      Gait: Gait is intact.      Deep Tendon Reflexes: Strength normal.      Reflex Scores:       Tricep reflexes are 2+ on the right side and 2+ on the left side.       Bicep reflexes are 2+ on the right side and 2+ on the left side.       Brachioradialis reflexes are 2+ on the right side and 2+ on the left side.       Patellar reflexes are 2+ on the right side and 2+ on the left side.       Achilles reflexes are 2+ on the right  side and 2+ on the left side.  Psychiatric:         Mood and Affect: Mood is depressed.         Speech: Speech normal.         Behavior: Behavior normal.         Thought Content: Thought content normal.         Cognition and Memory: Cognition and memory normal.         Judgment: Judgment normal.         Assessment/Plan:       Diagnoses and all orders for this visit:    1. Intractable chronic migraine without aura and without status migrainosus (Primary)  -     topiramate (TOPAMAX) 100 MG tablet; Take 1 tablet by mouth Daily.  Dispense: 90 tablet; Refill: 3  -     folic acid (FOLVITE) 1 MG tablet; Take 1 tablet by mouth Every Night.  Dispense: 90 tablet; Refill: 3    2. Nausea  -     promethazine (PHENERGAN) 25 MG tablet; Take 1 tablet by mouth Every 6 (Six) Hours As Needed for Nausea or Vomiting.  Dispense: 24 tablet; Refill: 3           She will continue current medications. Recommended she consider Botox injections for chronic migraine prevention in the future. She wants to wait on any changes for now. Her weight has been stable so we could also consider increase in topiramate. Does not wish to have neuro imaging. Recommend dilated eye exam when able since she is overdue for this by report. F/U in 6 months for re-eval or sooner if needed. On OCPs-aware of fetal risks if pregnancy occurs; she has no plans for pregnancy at this time. If she changes her mind she is to let us know.  Reviewed medications, potential side effects and signs and symptoms to report. Discussed risk versus benefits of treatment plan with patient and/or family-including medications, labs and radiology that may be ordered. Addressed questions and concerns during visit. Patient and/or family verbalized understanding and agree with plan.    AS THE PROVIDER, I PERSONALLY WORE PPE DURING ENTIRE FACE TO FACE ENCOUNTER IN CLINIC WITH THE PATIENT. PATIENT ALSO WORE PPE DURING ENTIRE FACE TO FACE ENCOUNTER EXCEPT FOR A MAX OF 30 SECONDS DURING  NEUROLOGICAL EVALUATION OF CRANIAL NERVES AND THEN MASK WAS PLACED BACK OVER PATIENT FACE FOR REMAINDER OF VISIT. I WASHED MY HANDS BEFORE AND AFTER VISIT.    During this visit the following were done:  Labs Reviewed []    Labs Ordered []    Radiology Reports Reviewed []    Radiology Ordered []    PCP Records Reviewed [x]    Referring Provider Records Reviewed []    ER Records Reviewed []    Hospital Records Reviewed []    History Obtained From Family []    Radiology Images Reviewed []    Other Reviewed [x]    Records Requested []      Gita Andersen, APRN  9/13/2021

## 2021-09-15 ENCOUNTER — TELEPHONE (OUTPATIENT)
Dept: NEUROLOGY | Facility: CLINIC | Age: 36
End: 2021-09-15

## 2021-09-15 NOTE — TELEPHONE ENCOUNTER
Yari sent me a iPeent message and would like to move forward with starting Botox injections for chronic migraine prevention.  Please get her authorized and approved for Botox injections.  Please get her scheduled once she is approved.  Let me know if we need to overbook her.  Please notify her of the process moving forward.  Thanks, ELISHA Fowler.

## 2021-09-20 ENCOUNTER — OFFICE VISIT (OUTPATIENT)
Dept: INTERNAL MEDICINE | Facility: CLINIC | Age: 36
End: 2021-09-20

## 2021-09-20 VITALS
SYSTOLIC BLOOD PRESSURE: 108 MMHG | HEART RATE: 98 BPM | WEIGHT: 117 LBS | BODY MASS INDEX: 20.73 KG/M2 | TEMPERATURE: 98.1 F | DIASTOLIC BLOOD PRESSURE: 70 MMHG | HEIGHT: 63 IN | RESPIRATION RATE: 16 BRPM | OXYGEN SATURATION: 100 %

## 2021-09-20 DIAGNOSIS — Z01.419 WELL WOMAN EXAM WITH ROUTINE GYNECOLOGICAL EXAM: Primary | ICD-10-CM

## 2021-09-20 DIAGNOSIS — Z23 NEED FOR TDAP VACCINATION: ICD-10-CM

## 2021-09-20 DIAGNOSIS — Z23 NEED FOR INFLUENZA VACCINATION: ICD-10-CM

## 2021-09-20 PROCEDURE — 99395 PREV VISIT EST AGE 18-39: CPT | Performed by: NURSE PRACTITIONER

## 2021-09-20 PROCEDURE — 90686 IIV4 VACC NO PRSV 0.5 ML IM: CPT | Performed by: NURSE PRACTITIONER

## 2021-09-20 PROCEDURE — 90715 TDAP VACCINE 7 YRS/> IM: CPT | Performed by: NURSE PRACTITIONER

## 2021-09-20 PROCEDURE — 90471 IMMUNIZATION ADMIN: CPT | Performed by: NURSE PRACTITIONER

## 2021-09-20 PROCEDURE — 90472 IMMUNIZATION ADMIN EACH ADD: CPT | Performed by: NURSE PRACTITIONER

## 2021-09-22 ENCOUNTER — TELEPHONE (OUTPATIENT)
Dept: INTERNAL MEDICINE | Facility: CLINIC | Age: 36
End: 2021-09-22

## 2021-09-22 ENCOUNTER — TELEPHONE (OUTPATIENT)
Dept: NEUROLOGY | Facility: CLINIC | Age: 36
End: 2021-09-22

## 2021-09-22 DIAGNOSIS — G43.719 INTRACTABLE CHRONIC MIGRAINE WITHOUT AURA AND WITHOUT STATUS MIGRAINOSUS: Primary | ICD-10-CM

## 2021-09-22 RX ORDER — BUTALBITAL, ACETAMINOPHEN AND CAFFEINE 50; 325; 40 MG/1; MG/1; MG/1
1 TABLET ORAL EVERY 6 HOURS PRN
Qty: 12 TABLET | Refills: 0 | Status: SHIPPED | OUTPATIENT
Start: 2021-09-22 | End: 2022-03-14 | Stop reason: SDUPTHER

## 2021-09-22 NOTE — TELEPHONE ENCOUNTER
Yari, I have documented that you have tried all medications and have not tolerated any prescriptions for the migraines. Would you be able to go get an IV migraine infusion if someone could drive you to try and stop this migraine. This would be at [facility] and would be today or tomorrow. If so, you need to let us know so mike my MA can get this scheduled and I can place orders. The other option is I can call in a steroid to your pharmacy. Let us know. Thanks, ELISHA Fowler    This is my mychart response to patient. See what she says

## 2021-09-22 NOTE — TELEPHONE ENCOUNTER
Are you reading the right note???    9/13/2021-this is part of my note of all meds tried-  She tells me while in [place] she was treated with Ajovy, Emgality and Aimovig and all 3 of these injections caused severe side effects including vertigo and severe anxiety.  She was also prescribed Ubrelvy which also caused her severe anxiety and she was unable to take this medication.  She was also treated with a beta-blocker which worsened her migraines.  She previously was treated with amitriptyline which was not tolerated.  She is back on her topiramate 100 mg daily which she previously took for several years.  She does not want to change any of her medications at this time.  She does use Advil dual action as needed.  She uses Phenergan as needed for nausea.  She has tried reportedly every triptan made and has found none of these helpful for her migraines.  She is used sumatriptan, rizatriptan, naratriptan, eletriptan, almotriptan, rizatriptan with intolerance or no relief.

## 2021-09-22 NOTE — TELEPHONE ENCOUNTER
Caller:PATIENT    Relationship: SELF    Best call back number: [phone]    What was the call regarding: PATIENT HAS A BAD MIGRAINE AND NEEDS LESLIE GARNER TO CALL SOMETHING IN FOR HER- I DID ADVISED PATIENT TO GO TO ER IF HER MIGRAINE WAS BAD.       Do you require a callback: IF NEEDED

## 2021-09-22 NOTE — TELEPHONE ENCOUNTER
Patient called regarding needing something for her migraines. She is not able to take any triptans.

## 2021-10-05 DIAGNOSIS — G43.719 INTRACTABLE CHRONIC MIGRAINE WITHOUT AURA AND WITHOUT STATUS MIGRAINOSUS: ICD-10-CM

## 2021-10-05 RX ORDER — TOPIRAMATE 100 MG/1
100 TABLET, FILM COATED ORAL DAILY
Qty: 90 TABLET | Refills: 3 | OUTPATIENT
Start: 2021-10-05

## 2021-11-10 ENCOUNTER — OFFICE VISIT (OUTPATIENT)
Dept: INTERNAL MEDICINE | Facility: CLINIC | Age: 36
End: 2021-11-10

## 2021-11-10 VITALS
TEMPERATURE: 98.4 F | OXYGEN SATURATION: 99 % | HEIGHT: 68 IN | WEIGHT: 114 LBS | BODY MASS INDEX: 17.28 KG/M2 | HEART RATE: 96 BPM

## 2021-11-10 DIAGNOSIS — J32.9 SINUSITIS, UNSPECIFIED CHRONICITY, UNSPECIFIED LOCATION: Primary | ICD-10-CM

## 2021-11-10 DIAGNOSIS — J06.9 UPPER RESPIRATORY TRACT INFECTION, UNSPECIFIED TYPE: Primary | ICD-10-CM

## 2021-11-10 PROCEDURE — 96372 THER/PROPH/DIAG INJ SC/IM: CPT | Performed by: NURSE PRACTITIONER

## 2021-11-10 RX ORDER — DEXAMETHASONE SODIUM PHOSPHATE 4 MG/ML
10 INJECTION, SOLUTION INTRA-ARTICULAR; INTRALESIONAL; INTRAMUSCULAR; INTRAVENOUS; SOFT TISSUE ONCE
Status: COMPLETED | OUTPATIENT
Start: 2021-11-10 | End: 2021-11-10

## 2021-11-10 RX ORDER — CEFDINIR 300 MG/1
300 CAPSULE ORAL 2 TIMES DAILY
Qty: 20 CAPSULE | Refills: 0 | Status: SHIPPED | OUTPATIENT
Start: 2021-11-10 | End: 2021-11-20

## 2021-11-10 RX ORDER — FLUCONAZOLE 150 MG/1
150 TABLET ORAL ONCE
Qty: 2 TABLET | Refills: 0 | Status: SHIPPED | OUTPATIENT
Start: 2021-11-10 | End: 2021-11-10

## 2021-11-10 RX ORDER — CEFTRIAXONE 500 MG/1
500 INJECTION, POWDER, FOR SOLUTION INTRAMUSCULAR; INTRAVENOUS ONCE
Status: COMPLETED | OUTPATIENT
Start: 2021-11-10 | End: 2021-11-10

## 2021-11-10 RX ORDER — DEXAMETHASONE SODIUM PHOSPHATE 10 MG/ML
10 INJECTION, SOLUTION INTRAMUSCULAR; INTRAVENOUS ONCE
Status: DISCONTINUED | OUTPATIENT
Start: 2021-11-10 | End: 2021-11-10

## 2021-11-10 RX ADMIN — CEFTRIAXONE 500 MG: 500 INJECTION, POWDER, FOR SOLUTION INTRAMUSCULAR; INTRAVENOUS at 13:34

## 2021-11-10 RX ADMIN — DEXAMETHASONE SODIUM PHOSPHATE 10 MG: 4 INJECTION, SOLUTION INTRA-ARTICULAR; INTRALESIONAL; INTRAMUSCULAR; INTRAVENOUS; SOFT TISSUE at 14:16

## 2021-12-04 DIAGNOSIS — Z30.41 SURVEILLANCE FOR BIRTH CONTROL, ORAL CONTRACEPTIVES: ICD-10-CM

## 2021-12-04 RX ORDER — ACETAMINOPHEN AND CODEINE PHOSPHATE 120; 12 MG/5ML; MG/5ML
SOLUTION ORAL
Qty: 84 TABLET | Refills: 1 | OUTPATIENT
Start: 2021-12-04

## 2022-01-05 ENCOUNTER — OFFICE VISIT (OUTPATIENT)
Dept: INTERNAL MEDICINE | Facility: CLINIC | Age: 37
End: 2022-01-05

## 2022-01-05 VITALS
BODY MASS INDEX: 17.34 KG/M2 | OXYGEN SATURATION: 98 % | HEART RATE: 120 BPM | HEIGHT: 68 IN | SYSTOLIC BLOOD PRESSURE: 112 MMHG | TEMPERATURE: 101.1 F | DIASTOLIC BLOOD PRESSURE: 76 MMHG

## 2022-01-05 DIAGNOSIS — R05.9 COUGH: ICD-10-CM

## 2022-01-05 DIAGNOSIS — J06.9 VIRAL URI: Primary | ICD-10-CM

## 2022-01-05 LAB
EXPIRATION DATE: NORMAL
FLUAV AG NPH QL: NEGATIVE
FLUBV AG NPH QL: NEGATIVE
INTERNAL CONTROL: NORMAL
Lab: NORMAL

## 2022-01-05 PROCEDURE — 87804 INFLUENZA ASSAY W/OPTIC: CPT | Performed by: STUDENT IN AN ORGANIZED HEALTH CARE EDUCATION/TRAINING PROGRAM

## 2022-01-05 PROCEDURE — 99214 OFFICE O/P EST MOD 30 MIN: CPT | Performed by: STUDENT IN AN ORGANIZED HEALTH CARE EDUCATION/TRAINING PROGRAM

## 2022-01-05 PROCEDURE — U0004 COV-19 TEST NON-CDC HGH THRU: HCPCS | Performed by: STUDENT IN AN ORGANIZED HEALTH CARE EDUCATION/TRAINING PROGRAM

## 2022-01-05 RX ORDER — DEXTROMETHORPHAN HYDROBROMIDE AND PROMETHAZINE HYDROCHLORIDE 15; 6.25 MG/5ML; MG/5ML
5 SYRUP ORAL 4 TIMES DAILY PRN
Qty: 118 ML | Refills: 0 | Status: SHIPPED | OUTPATIENT
Start: 2022-01-05 | End: 2022-03-14

## 2022-01-05 RX ORDER — AMOXICILLIN AND CLAVULANATE POTASSIUM 875; 125 MG/1; MG/1
1 TABLET, FILM COATED ORAL 2 TIMES DAILY
Qty: 10 TABLET | Refills: 0 | Status: CANCELLED | OUTPATIENT
Start: 2022-01-05

## 2022-01-05 NOTE — PROGRESS NOTES
"              Internal Medicine Acute Visit     Patient Name: Yari Carlson  : 1985   MRN: [id number]     Chief Complaint:    Chief Complaint   Patient presents with   • Sore Throat     cough   • Nasal Congestion   • Fever   • Headache   • Pain   • Fatigue       History of Present Illness: Yari Carlson is a 36 y.o. female who presents for excruciating headache, cough, fatigue, congestion.  There have been several coworkers that have similar symptoms that were diagnosed with Covid but she also notes that strep and flu have also been going around.  She has tried NyQuil and Advil without relief.  She feels miserable.    Subjective     I have reviewed and the following portions of the patient's history were updated as appropriate: past family history, past medical history, past social history, past surgical history and problem list.    Allergies:   Allergies   Allergen Reactions   • Naproxen GI Intolerance   • Sulfur Hives       Objective     Physical Exam:  Vital Signs:   Vitals:    22 1126   BP: 112/76   Pulse: 120   Temp: (!) 101.1 °F (38.4 °C)   SpO2: 98%   Height: 172.7 cm (67.99\")   PainSc:   8     Body mass index is 17.34 kg/m².    Physical Exam  Vitals and nursing note reviewed.   Constitutional:       General: She is not in acute distress.     Appearance: Normal appearance. She is ill-appearing. She is not toxic-appearing.   HENT:      Head: Normocephalic and atraumatic.   Cardiovascular:      Rate and Rhythm: Normal rate.   Pulmonary:      Effort: Pulmonary effort is normal.   Skin:     General: Skin is warm and dry.   Neurological:      Mental Status: She is alert.       Assessment / Plan      Assessment/Plan:   Diagnoses and all orders for this visit:    1. Viral URI (Primary)  Acute illness with systemic symptoms including severe headache, cough, fatigue, congestion.  Symptoms consistent with Covid.  Covid test ordered and pending.  Influenza test ordered and negative.  " Promethazine-dextromethorphan cough suppressant prescribed.   If COVID - symptomatic management. Tylenol is preferred for fever, muscle aches, and headache but if it does not work, ibuprofen, motrin, or aleve can be used. Stay hydrated. Use over the counter cough suppressants for cough. Move around as much as you are able to and reposition frequently. Rest as much as you need to but advance the amount of activity you are doing as able. If you have trouble breathing, persistent chest pain or pressure, new confusion, inability to wake or stay awake, or pale grey or blue-colored skin, lips, or nail beds, you should be evaluated at the ED.  Patient will be called and sent ERPLY message with results and these recommendations if positive.      2. Cough  Covid and flu test ordered.    Follow Up:   Return for Next scheduled follow up.    Time:   I spent approximately 15 minutes providing clinical care for this patient; including review of patient's chart and provider documentation, face to face time spent with patient in examination room (obtaining history, performing physical exam, discussing diagnosis and management options), placing orders, and completing patient documentation.     Alejandra Barnes MD  [facility] [place]

## 2022-01-06 LAB — SARS-COV-2 RNA NOSE QL NAA+PROBE: DETECTED

## 2022-01-10 ENCOUNTER — TELEPHONE (OUTPATIENT)
Dept: INTERNAL MEDICINE | Facility: CLINIC | Age: 37
End: 2022-01-10

## 2022-01-10 NOTE — TELEPHONE ENCOUNTER
Caller: Yari Carlson    Relationship: Self    Best call back number: [phone] (H)    PATIENT CALLED AND STATED SHE IS COVID POSITIVE. PATIENT IS HAVING A SHARP PAIN ON THE RIGHT SIDE UNDER HER RIBS( FEELS LIKE SOMEONE IS STABBING HER) AND IS UNABLE TO SLEEP     ALSO THE COUGH MEDICATION IS NOT REALLY HELPING    PATIENTS FEVER BROKE YESTERDAY AND WANTS TO KNOW WHEN IS SHE SUPPOSE TO RETURN TO WORK.     PLEASE ADVISE

## 2022-01-10 NOTE — TELEPHONE ENCOUNTER
I spoke with patient and patient states that she has sharp pain under her ribs and especially painful when she breathes.  I have advised patient to go to ED for evaluation and emphasized the importance since blood clots can occur with Covid.  Patient states she doesn't want to go to ED but again emphasized and explained the importance of ruling this out.  Patient verbalized understanding.

## 2022-03-14 ENCOUNTER — OFFICE VISIT (OUTPATIENT)
Dept: NEUROLOGY | Facility: CLINIC | Age: 37
End: 2022-03-14

## 2022-03-14 VITALS
HEIGHT: 68 IN | BODY MASS INDEX: 18.34 KG/M2 | OXYGEN SATURATION: 98 % | DIASTOLIC BLOOD PRESSURE: 68 MMHG | WEIGHT: 121 LBS | SYSTOLIC BLOOD PRESSURE: 102 MMHG | HEART RATE: 68 BPM

## 2022-03-14 DIAGNOSIS — R11.0 NAUSEA: ICD-10-CM

## 2022-03-14 DIAGNOSIS — G43.719 INTRACTABLE CHRONIC MIGRAINE WITHOUT AURA AND WITHOUT STATUS MIGRAINOSUS: Primary | ICD-10-CM

## 2022-03-14 PROCEDURE — 99214 OFFICE O/P EST MOD 30 MIN: CPT | Performed by: NURSE PRACTITIONER

## 2022-03-14 PROCEDURE — 96372 THER/PROPH/DIAG INJ SC/IM: CPT | Performed by: NURSE PRACTITIONER

## 2022-03-14 RX ORDER — PROMETHAZINE HYDROCHLORIDE 25 MG/1
25 TABLET ORAL EVERY 6 HOURS PRN
Qty: 24 TABLET | Refills: 3 | Status: SHIPPED | OUTPATIENT
Start: 2022-03-14

## 2022-03-14 RX ORDER — TOPIRAMATE 100 MG/1
100 TABLET, FILM COATED ORAL DAILY
Qty: 90 TABLET | Refills: 3 | Status: SHIPPED | OUTPATIENT
Start: 2022-03-14

## 2022-03-14 RX ORDER — BUTALBITAL, ACETAMINOPHEN AND CAFFEINE 50; 325; 40 MG/1; MG/1; MG/1
1 TABLET ORAL EVERY 6 HOURS PRN
Qty: 10 TABLET | Refills: 0 | Status: SHIPPED | OUTPATIENT
Start: 2022-03-14

## 2022-03-14 RX ORDER — ALPRAZOLAM 0.5 MG/1
0.5 TABLET ORAL 2 TIMES DAILY PRN
COMMUNITY

## 2022-03-14 RX ORDER — KETOROLAC TROMETHAMINE 30 MG/ML
60 INJECTION, SOLUTION INTRAMUSCULAR; INTRAVENOUS ONCE
Status: COMPLETED | OUTPATIENT
Start: 2022-03-14 | End: 2022-03-14

## 2022-03-14 RX ADMIN — KETOROLAC TROMETHAMINE 60 MG: 30 INJECTION, SOLUTION INTRAMUSCULAR; INTRAVENOUS at 10:20

## 2022-03-14 NOTE — PROGRESS NOTES
Subjective:     Patient ID: Yari Carlson is a 36 y.o. female.    CC:   Chief Complaint   Patient presents with   • Migraine   • Injections     Toradol 60mg im x 1 ndc 70868-511-06 lot#[id number] exp. 09/22       HPI:   History of Present Illness   This is a 35-year-old female who presents for 6 month follow up on chronic migraine headaches which have been present since age 6 years old. She had been living in [place], [place] and was under the care of neurology there and relocated back to [place], [place] in 2021.  She tells me while in [place] she was treated with Ajovy, Emgality and Aimovig and all 3 of these injections caused severe side effects including vertigo and severe anxiety.  She was also prescribed Ubrelvy which also caused her severe anxiety and she was unable to take this medication.  She was also treated with a beta-blocker which worsened her migraines.  She previously was treated with amitriptyline which was not tolerated.     She is currently on topiramate 100 mg daily which she has taken and tolerated well for many years. She does use Advil dual action as needed.  She uses Phenergan as needed for nausea. She has used fioricet PRN and would like a refill. Has only taken 10 in the past year. This works well for severe migraines. She has tried reportedly every triptan made and has found none of these helpful for her migraines.  She has used sumatriptan, rizatriptan, naratriptan, eletriptan, almotriptan, rizatriptan with intolerance or no relief.     She is experiencing 15/30 headache days a month with 8-10/30 migraine days a month for many years. Her migraines occur on the right frontal and temporal area, throbbing, nausea, occasional vomiting, photophobia and phonophobia and last 5-24 hours.  Midrin worked for her but is no longer available. Her migraines are unchanged. Her mom has had migraines since a teenager and takes topiramate 100mg twice a day. She denies any vision loss or  unilateral weakness or other concerning symptoms.  She has never had an MRI of her brain. She does not plan to become pregnant. She is on OCPs.    She was unable to tolerate folic acid multiple brands due to rash and itching scalp.    She has had a migraine since yesterday, right frontal, throbbing, pain 6/10, +photophobia, +phonophobia, nausea, not vomiting, had Advil dual action yesterday and phenergan. Would like toradol injection today which has worked well in the past and has been well tolerated.    Botox was ordered 10/5/2021 to start and this is pending pharmacy shipment. She would like to start Botox injections ASAP.    The following portions of the patient's history were reviewed and updated as appropriate: allergies, current medications, past family history, past medical history, past social history, past surgical history and problem list.    Past Medical History:   Diagnosis Date   • Abnormal Pap smear of cervix    • Anxiety    • Bacterial vaginosis    • Dysfunctional uterine bleeding    • Headache    • Migraine    • Migraine    • Tobacco use 2017       Past Surgical History:   Procedure Laterality Date   • ABDOMINAL SURGERY     • GALLBLADDER SURGERY  2020   • TOOTH EXTRACTION         Social History     Socioeconomic History   • Marital status: Single   Tobacco Use   • Smoking status: Former Smoker     Packs/day: 0.25     Years: 7.00     Pack years: 1.75     Types: Cigarettes     Quit date: 2017     Years since quittin.7   • Smokeless tobacco: Never Used   • Tobacco comment: chantix can not be filled due to recall   Vaping Use   • Vaping Use: Every day   Substance and Sexual Activity   • Alcohol use: Yes     Comment: social   • Drug use: No   • Sexual activity: Yes     Partners: Male     Birth control/protection: OCP       Family History   Problem Relation Age of Onset   • Thyroid disease Mother    • Migraines Mother    • Diabetes Maternal Grandmother    • Cancer Maternal Grandmother    •  "Heart disease Maternal Grandmother    • Hyperlipidemia Maternal Grandmother    • Obesity Maternal Grandmother    • Diabetes Maternal Grandfather    • Migraines Maternal Grandfather    • Diabetes Paternal Grandmother    • Cancer Paternal Grandmother    • Diabetes Paternal Grandfather         type 1   • Arthritis Paternal Aunt         rheumatoid   • Lupus Paternal Aunt    • Other Other         cardiac disorder   • Stroke Other    • Diabetes Other    • Cancer Other         Review of Systems   Constitutional: Negative for chills, fatigue, fever and unexpected weight change.   HENT: Negative for ear pain, hearing loss, nosebleeds, rhinorrhea and sore throat.    Eyes: Negative for photophobia, pain, discharge, itching and visual disturbance.   Respiratory: Negative for cough, chest tightness, shortness of breath and wheezing.    Cardiovascular: Negative for chest pain, palpitations and leg swelling.   Gastrointestinal: Negative for abdominal pain, blood in stool, constipation, diarrhea, nausea and vomiting.   Genitourinary: Negative for dysuria, frequency, hematuria and urgency.   Musculoskeletal: Negative for arthralgias, back pain, gait problem, joint swelling, myalgias, neck pain and neck stiffness.   Skin: Negative for rash and wound.   Allergic/Immunologic: Negative for environmental allergies and food allergies.   Neurological: Positive for headaches. Negative for dizziness, tremors, seizures, syncope, speech difficulty, weakness, light-headedness and numbness.   Hematological: Negative for adenopathy. Does not bruise/bleed easily.   Psychiatric/Behavioral: Negative for agitation, confusion, decreased concentration, hallucinations, sleep disturbance and suicidal ideas. The patient is not nervous/anxious.    All other systems reviewed and are negative.       Objective:  /68   Pulse 68   Ht 172.7 cm (68\")   Wt 54.9 kg (121 lb)   SpO2 98%   BMI 18.40 kg/m²     Neurologic Exam     Mental Status   Oriented to " person, place, and time.   Speech: speech is normal   Level of consciousness: alert    Cranial Nerves   Cranial nerves II through XII intact.     Motor Exam   Muscle bulk: normal  Overall muscle tone: normal    Strength   Strength 5/5 throughout.     Gait, Coordination, and Reflexes     Gait  Gait: normal    Coordination   Finger to nose coordination: normal    Tremor   Resting tremor: absent  Intention tremor: absent  Action tremor: absent      Physical Exam  Constitutional:       Appearance: Normal appearance.   Neurological:      Mental Status: She is alert and oriented to person, place, and time.      Coordination: Finger-Nose-Finger Test normal.      Gait: Gait is intact.      Deep Tendon Reflexes: Strength normal.   Psychiatric:         Mood and Affect: Mood and affect normal.         Speech: Speech normal.         Behavior: Behavior normal.         Thought Content: Thought content normal.         Cognition and Memory: Cognition and memory normal.         Judgment: Judgment normal.       Toradol Injection    Chief Complaint   Patient presents with   • Migraine   • Injections     Toradol 60mg im x 1 ndc 13154-888-21 lot#[id number] exp. 09/22       After risks and benefits were explained including bleeding, infection, worsening of the pain, damage to the area being injected, weakness, allergic reaction to medications, vascular injection, and nerve damage, signed consent was obtained.  All questions were answered.      The gluteus muscle was identified and the skin prepped three times with alcohol and the alcohol allowed to dry.  Next, a 25 gauge 1 inch needle was placed at the gluteus muscle. Once negative aspiration confirmed, the medication was injected and the needle removed.      The patient Did tolerate procedure well without complications.      Medication used: Toradol (Ketoralac) 60mg/ml x1    Injection location:  right gluteus muscle administered by Lydia Hernández CMA-confirmed correct medication,  dosage and patient.      Assessment/Plan:       Diagnoses and all orders for this visit:    1. Intractable chronic migraine without aura and without status migrainosus (Primary)  -     butalbital-acetaminophen-caffeine (FIORICET, ESGIC) -40 MG per tablet; Take 1 tablet by mouth Every 6 (Six) Hours As Needed for Migraine. Max 10 per month use sparingly  Dispense: 10 tablet; Refill: 0  -     topiramate (TOPAMAX) 100 MG tablet; Take 1 tablet by mouth Daily.  Dispense: 90 tablet; Refill: 3  -     ketorolac (TORADOL) injection 60 mg    2. Nausea  -     promethazine (PHENERGAN) 25 MG tablet; Take 1 tablet by mouth Every 6 (Six) Hours As Needed for Nausea or Vomiting.  Dispense: 24 tablet; Refill: 3         [facility] [phone]-she called them during visit today. They are to call her back today for her copay. Once she speaks with them and they ship Botox, we can start her injections since her current Auth does not  until 2022. We have her tentatively scheduled next Monday 3.21.22 at 930am to start Botox injections. She is aware of Botox process for shipping with pharmacy. Liz Dickinson MA Botox coordinator provided additional information. If her Botox is not shipped by this date, she is aware we will have to reschedule until after 22 with new auth. Continue topamax and phenergan prn. fioricet #10 max per month prn due to multiple prior failures with triptans and cgrps/gpants. Discussed medicine overuse.     As part of this patient's treatment plan I am prescribing controlled substances. The patient has been made aware of appropriate use of such medications, including potential risk of somnolence, limited ability to drive and/or work safely, and potential for dependence or overdose. It has also been made clear that these medications are for use by the patient only, without concomitant use of alcohol or other substances unless prescribed. Keep out of reach of children.  Keith report has been  reviewed. If this is going to be prescribed long term, [facility] Controlled Substance Agreement Contract has also been read and signed by patient and myself.    I have personally notified patient of new Botox policies and procedures for [facility].Their insurance's preferred [facility] pharmacy will contact them by phone to confirm their Botox shipment to our clinic. The patient must answer the phone call and/or return their call ASAP to give verbal permission for their Botox to be shipped. Receiving their Botox Injections as scheduled is contingent upon their adherence to this new policy & procedure. The Botox injections will be rescheduled and delayed if the patient does not personally contact the pharmacy prior to their Botox appointment. They have verbalized understanding and agree to adhere to this new process.     They have also been notified of a new Botox Savings program for commercially insured patient's. They are encouraged to register at [employer] to receive these special benefits with copay assistance. They may also contact Botox at 7-244-21-[employer] or [place] [address] for more information.    I have recommended Botox using FDA approved protocol for chronic migraine prevention resistant to prior regimens as listed above. We have discussed risk and benefits of this Botox procedure and common side effects including headache, neck pain, neck stiffness or weakness, ptosis, flu-like symptoms as well as more serious possible adverse effects including possible dysphagia, respiratory distress or even death (extremely rare in Botox for Chronic Migraine Prevention) Also recommended no massages, no heavy lifting, no chiropractic adjustments or other injections of any kind in the areas where Botox was administered for 72 hours following injections. The patient and/or family verbalizes understanding, accepts risks and agrees with moving forward with Botox injections for chronic migraine  prevention.    Reviewed medications, potential side effects and signs and symptoms to report. Discussed risk versus benefits of treatment plan with patient and/or family-including medications, labs and radiology that may be ordered. Addressed questions and concerns during visit. Patient and/or family verbalized understanding and agree with plan.    AS THE PROVIDER, I PERSONALLY WORE PPE DURING ENTIRE FACE TO FACE ENCOUNTER IN CLINIC WITH THE PATIENT. PATIENT ALSO WORE PPE DURING ENTIRE FACE TO FACE ENCOUNTER EXCEPT FOR A MAX OF 30 SECONDS DURING NEUROLOGICAL EVALUATION OF CRANIAL NERVES AND THEN MASK WAS PLACED BACK OVER PATIENT FACE FOR REMAINDER OF VISIT. I WASHED MY HANDS BEFORE AND AFTER VISIT.    During this visit the following were done:  Labs Reviewed []    Labs Ordered []    Radiology Reports Reviewed []    Radiology Ordered []    PCP Records Reviewed []    Referring Provider Records Reviewed []    ER Records Reviewed []    Hospital Records Reviewed []    History Obtained From Family []    Radiology Images Reviewed []    Other Reviewed [x]    Records Requested []      Gita Andersen, ELISHA  3/14/2022

## 2022-03-21 ENCOUNTER — PROCEDURE VISIT (OUTPATIENT)
Dept: NEUROLOGY | Facility: CLINIC | Age: 37
End: 2022-03-21

## 2022-03-21 VITALS
BODY MASS INDEX: 17.88 KG/M2 | HEART RATE: 83 BPM | DIASTOLIC BLOOD PRESSURE: 66 MMHG | HEIGHT: 68 IN | OXYGEN SATURATION: 99 % | WEIGHT: 118 LBS | SYSTOLIC BLOOD PRESSURE: 118 MMHG

## 2022-03-21 DIAGNOSIS — G43.719 INTRACTABLE CHRONIC MIGRAINE WITHOUT AURA AND WITHOUT STATUS MIGRAINOSUS: Primary | ICD-10-CM

## 2022-03-21 PROCEDURE — 64615 CHEMODENERV MUSC MIGRAINE: CPT | Performed by: NURSE PRACTITIONER

## 2022-03-21 NOTE — PROGRESS NOTES
"CC: Botox Injections  Indication for Procedure: Chronic migraines    /66   Pulse 83   Ht 172.7 cm (68\")   Wt 53.5 kg (118 lb)   SpO2 99%   BMI 17.94 kg/m²     Date of last Injection: N/A First Botox injections today  Response: N/A  Onset of response: N/A  Wearing off: N/A  Side Effects: No immediate side effects reported  : [employer]  Lot #: [id number]  Expiration: 11/2024  NDC: 6499-3594-39    With written consent obtained and risks and benefits explained to patient.     Botox injected using FDA approved protocol for chronic migraine prevention.   10 units, Procerus 5 units, Frontalis 20 units, Temporalis 40 units, Occipitalis 30 units, Cervical Paraspinals 20 units, Trapezius 30 units.     The total amount injected in units is 155.  The total amount wasted in units is 45.  The total amount submitted in units is 200.  Botox was supplied by [facility].    Patient tolerated procedure well with no immediate complications.     We have discussed risk and benefits of this Botox procedure and common side effects including headache, neck pain, neck stiffness or weakness, ptosis, flu-like symptoms as well as more serious possible adverse effects including possible dysphagia, respiratory distress or even death (extremely rare in Botox for Chronic Migraine Prevention) Also recommended no massages, no heavy lifting, no chiropractic adjustments or other injections of any kind in the areas where Botox was administered for 72 hours following injections. The patient and/or family verbalizes understanding, accepts risks and agrees with moving forward with Botox injections for chronic migraine prevention.      Gita Andersen, APRN  3/21/2022  "

## 2022-03-30 ENCOUNTER — LAB (OUTPATIENT)
Dept: LAB | Facility: HOSPITAL | Age: 37
End: 2022-03-30

## 2022-03-30 ENCOUNTER — OFFICE VISIT (OUTPATIENT)
Dept: INTERNAL MEDICINE | Facility: CLINIC | Age: 37
End: 2022-03-30

## 2022-03-30 VITALS
TEMPERATURE: 96 F | DIASTOLIC BLOOD PRESSURE: 78 MMHG | SYSTOLIC BLOOD PRESSURE: 118 MMHG | HEART RATE: 98 BPM | WEIGHT: 116 LBS | HEIGHT: 68 IN | BODY MASS INDEX: 17.58 KG/M2 | OXYGEN SATURATION: 99 %

## 2022-03-30 DIAGNOSIS — N64.4 SORENESS BREAST: ICD-10-CM

## 2022-03-30 DIAGNOSIS — N91.2 AMENORRHEA: Primary | ICD-10-CM

## 2022-03-30 LAB
B-HCG UR QL: NEGATIVE
EXPIRATION DATE: NORMAL
HCG INTACT+B SERPL-ACNC: <0.5 MIU/ML
INTERNAL NEGATIVE CONTROL: NORMAL
INTERNAL POSITIVE CONTROL: NORMAL
Lab: NORMAL

## 2022-03-30 PROCEDURE — 36415 COLL VENOUS BLD VENIPUNCTURE: CPT

## 2022-03-30 PROCEDURE — 84702 CHORIONIC GONADOTROPIN TEST: CPT

## 2022-03-30 PROCEDURE — 99214 OFFICE O/P EST MOD 30 MIN: CPT | Performed by: STUDENT IN AN ORGANIZED HEALTH CARE EDUCATION/TRAINING PROGRAM

## 2022-03-30 PROCEDURE — 81025 URINE PREGNANCY TEST: CPT | Performed by: STUDENT IN AN ORGANIZED HEALTH CARE EDUCATION/TRAINING PROGRAM

## 2022-03-30 RX ORDER — ONDANSETRON 4 MG/1
4 TABLET, FILM COATED ORAL EVERY 8 HOURS PRN
Qty: 15 TABLET | Refills: 3 | Status: SHIPPED | OUTPATIENT
Start: 2022-03-30 | End: 2022-06-20

## 2022-03-30 NOTE — PROGRESS NOTES
"              Internal Medicine Acute Visit     Patient Name: Yari Carlson  : 1985   MRN: [id number]     Chief Complaint:    Chief Complaint   Patient presents with   • Nausea     Pregnancy test needed   • Headache   • Fatigue       History of Present Illness: Yari Carlson is a 36 y.o. female who presents for nausea, sensitivity to smell, and sore breasts. Home pregnancy test was negative. Last period was ~ 6 weeks ago. In 2022, she was given high dose progesterone to stop heavy menstrual bleeding she has been on norethindrone 0.35 mg daily since then.  She has never had these symptoms with norethindrone before.     She reports being told she would be unable to get pregnant due to history of PID causing scar tissue. She has been taking norethindrone 0.35 mg daily without missed dose. Her partner had a vasectomy 2 years ago.     Subjective     I have reviewed and the following portions of the patient's history were updated as appropriate: past family history, past medical history, past social history, past surgical history and problem list.    Allergies:   Allergies   Allergen Reactions   • Elemental Sulfur Hives   • Naproxen GI Intolerance   • Folic Acid Rash     Objective     Physical Exam:  Vital Signs:   Vitals:    22 1153   BP: 118/78   Pulse: 98   Temp: 96 °F (35.6 °C)   SpO2: 99%   Weight: 52.6 kg (116 lb)   Height: 172.7 cm (68\")   PainSc:   4     Body mass index is 17.64 kg/m².    Physical Exam  Vitals and nursing note reviewed.   Constitutional:       General: She is not in acute distress.     Appearance: Normal appearance. She is not ill-appearing or toxic-appearing.   Cardiovascular:      Rate and Rhythm: Normal rate.   Pulmonary:      Effort: Pulmonary effort is normal. No respiratory distress.   Neurological:      Mental Status: She is alert.   Psychiatric:         Mood and Affect: Mood normal. Affect is flat.       Assessment / Plan      Assessment/Plan:   Diagnoses and " all orders for this visit:    1. Amenorrhea (Primary)  POC UPT negative. Will check quant pregnancy test to confirm though low likelihood given patient's history of PID with scarring, use of daily norethindrone, and her partner's prior vasectomy. Discussed symptoms of breast soreness, amenorrhea, nausea, acne could all be related to norethindrone (though she has been on this medication for some time without side effects). Will hold this medication and have telehealth follow up in 1 week to see how symptoms are. Counseled that she may have withdrawal bleeding when stopping this medication. Zofran prescribed for nausea.     Follow Up:   Return in about 1 week (around 4/6/2022) for Recheck, telehealth .    Time:   I spent approximately 30 minutes providing clinical care for this patient; including review of patient's chart and provider documentation, face to face time spent with patient in examination room (obtaining history, performing physical exam, discussing diagnosis and management options), placing orders, and completing patient documentation.     Alejandra Barnes MD  [facility]

## 2022-04-25 ENCOUNTER — OFFICE VISIT (OUTPATIENT)
Dept: INTERNAL MEDICINE | Facility: CLINIC | Age: 37
End: 2022-04-25

## 2022-04-25 VITALS
TEMPERATURE: 99.3 F | HEIGHT: 68 IN | SYSTOLIC BLOOD PRESSURE: 102 MMHG | DIASTOLIC BLOOD PRESSURE: 74 MMHG | HEART RATE: 112 BPM | RESPIRATION RATE: 18 BRPM | OXYGEN SATURATION: 99 % | WEIGHT: 115 LBS | BODY MASS INDEX: 17.43 KG/M2

## 2022-04-25 DIAGNOSIS — J06.9 UPPER RESPIRATORY TRACT INFECTION, UNSPECIFIED TYPE: Primary | ICD-10-CM

## 2022-04-25 LAB
EXPIRATION DATE: NORMAL
EXPIRATION DATE: NORMAL
FLUAV AG UPPER RESP QL IA.RAPID: NOT DETECTED
FLUBV AG UPPER RESP QL IA.RAPID: NOT DETECTED
INTERNAL CONTROL: NORMAL
INTERNAL CONTROL: NORMAL
Lab: NORMAL
Lab: NORMAL
S PYO AG THROAT QL: NEGATIVE
SARS-COV-2 AG UPPER RESP QL IA.RAPID: NOT DETECTED

## 2022-04-25 PROCEDURE — 87428 SARSCOV & INF VIR A&B AG IA: CPT | Performed by: NURSE PRACTITIONER

## 2022-04-25 PROCEDURE — 87880 STREP A ASSAY W/OPTIC: CPT | Performed by: NURSE PRACTITIONER

## 2022-04-25 PROCEDURE — 99214 OFFICE O/P EST MOD 30 MIN: CPT | Performed by: NURSE PRACTITIONER

## 2022-04-25 RX ORDER — AMOXICILLIN AND CLAVULANATE POTASSIUM 875; 125 MG/1; MG/1
1 TABLET, FILM COATED ORAL 2 TIMES DAILY
Qty: 20 TABLET | Refills: 0 | Status: SHIPPED | OUTPATIENT
Start: 2022-04-25 | End: 2022-05-05

## 2022-04-25 NOTE — PROGRESS NOTES
Chief Complaint   Patient presents with   • Sinus Problem     Congestion, earache, sore throat.       History of Present Illness    36 y.o.female presents for sinus problem.  Sinuses hurt, ears and throat glands hurt, no fever. Glands feel swollen in neck. Clear rhinorrhea. Cant stop blowing nose. Onset sx yesterday. Worse today. Has been able to east some yogurt and smoothie. No n/v/d.  Review of Systems   Constitutional: Negative for chills and fever.   HENT: Positive for congestion, ear pain, postnasal drip, rhinorrhea, sinus pressure and sore throat. Negative for sneezing.    Respiratory: Negative for cough and shortness of breath.    Musculoskeletal: Negative for myalgias.   Neurological: Negative for headache.       [facility]  The following portions of the patient's history were reviewed and updated as appropriate: allergies, current medications, past family history, past medical history, past social history, past surgical history and problem list.     Past Medical History:   Diagnosis Date   • Abnormal Pap smear of cervix    • Anxiety    • Bacterial vaginosis    • Dysfunctional uterine bleeding    • Headache    • Migraine    • Migraine    • Tobacco use 2017      Allergies   Allergen Reactions   • Elemental Sulfur Hives   • Naproxen GI Intolerance   • Folic Acid Rash      Social History     Tobacco Use   • Smoking status: Former Smoker     Packs/day: 0.25     Years: 7.00     Pack years: 1.75     Types: Cigarettes     Quit date: 2017     Years since quittin.9   • Smokeless tobacco: Never Used   • Tobacco comment: chantix can not be filled due to recall   Vaping Use   • Vaping Use: Every day   Substance Use Topics   • Alcohol use: Yes     Comment: social   • Drug use: No     Past Surgical History:   Procedure Laterality Date   • ABDOMINAL SURGERY     • GALLBLADDER SURGERY  2020   • TOOTH EXTRACTION        Family History   Problem Relation Age of Onset   • Thyroid disease Mother    • Migraines  "Mother    • Diabetes Maternal Grandmother    • Cancer Maternal Grandmother    • Heart disease Maternal Grandmother    • Hyperlipidemia Maternal Grandmother    • Obesity Maternal Grandmother    • Diabetes Maternal Grandfather    • Migraines Maternal Grandfather    • Diabetes Paternal Grandmother    • Cancer Paternal Grandmother    • Diabetes Paternal Grandfather         type 1   • Arthritis Paternal Aunt         rheumatoid   • Lupus Paternal Aunt    • Other Other         cardiac disorder   • Stroke Other    • Diabetes Other    • Cancer Other            Current Outpatient Medications:   •  ALPRAZolam (XANAX) 0.5 MG tablet, Take 0.5 mg by mouth 2 (Two) Times a Day As Needed for Anxiety., Disp: , Rfl:   •  butalbital-acetaminophen-caffeine (FIORICET, ESGIC) -40 MG per tablet, Take 1 tablet by mouth Every 6 (Six) Hours As Needed for Migraine. Max 10 per month use sparingly, Disp: 10 tablet, Rfl: 0  •  norethindrone (Cora) 0.35 MG tablet, Take 1 tablet by mouth daily, Disp: 84 tablet, Rfl: 1  •  OnabotulinumtoxinA 200 units reconstituted solution, Inject 155 units IM into head neck shoulders every 12 weeks per FDA protocol Dx G43.719, Disp: 1 each, Rfl: 3  •  ondansetron (Zofran) 4 MG tablet, Take 1 tablet by mouth Every 8 (Eight) Hours As Needed for Nausea or Vomiting., Disp: 15 tablet, Rfl: 3  •  promethazine (PHENERGAN) 25 MG tablet, Take 1 tablet by mouth Every 6 (Six) Hours As Needed for Nausea or Vomiting., Disp: 24 tablet, Rfl: 3  •  topiramate (TOPAMAX) 100 MG tablet, Take 1 tablet by mouth Daily., Disp: 90 tablet, Rfl: 3    VITALS:  /74   Pulse 112   Temp 99.3 °F (37.4 °C)   Resp 18   Ht 172.7 cm (68\")   Wt 52.2 kg (115 lb)   LMP 03/29/2022 (Exact Date)   SpO2 99%   Breastfeeding No   BMI 17.49 kg/m²     Physical Exam  Vitals reviewed.   HENT:      Head: Normocephalic.      Right Ear: Ear canal and external ear normal. A middle ear effusion is present. No mastoid tenderness. Tympanic " membrane is not injected, erythematous or bulging.      Left Ear: Ear canal and external ear normal. A middle ear effusion is present. No mastoid tenderness. Tympanic membrane is not injected, erythematous or bulging.      Nose: Congestion and rhinorrhea present. Rhinorrhea is clear.      Right Turbinates: Swollen.      Left Turbinates: Swollen.      Comments: Sinuses hurt all over     Mouth/Throat:      Mouth: Mucous membranes are moist.      Palate: No mass and lesions.      Pharynx: Uvula midline. Posterior oropharyngeal erythema present. No pharyngeal swelling, oropharyngeal exudate or uvula swelling.   Lymphadenopathy:      Head:      Right side of head: Tonsillar adenopathy present. No submental or submandibular adenopathy.      Left side of head: Tonsillar adenopathy present. No submental or submandibular adenopathy.      Cervical: No cervical adenopathy.      Right cervical: No superficial or deep cervical adenopathy.     Left cervical: No superficial or deep cervical adenopathy.   Neurological:      Mental Status: She is alert.         Result Review :            Assessment and Plan    Diagnoses and all orders for this visit:    1. Upper respiratory tract infection, unspecified type (Primary)  -     POCT rapid strep A  -     POCT SARS-CoV-2 Antigen WILIAN + Flu  -     amoxicillin-clavulanate (Augmentin) 875-125 MG per tablet; Take 1 tablet by mouth 2 (Two) Times a Day for 10 days.  Dispense: 20 tablet; Refill: 0    discussed differentials. Negative rapid covid, flu, and strep. Explained could still be other type of viral URI; pt continues to state feels like her sinus infections where in past she has had to get rocephin shot, steroids, and oral abx.  Recommended oral antihistamines and flonase reports does not work for her.     I discussed the patients findings and my recommendations with patient.  Patient was encouraged to keep me informed of any acute changes, lack of improvement, or any new concerning  symptoms.  Patient voiced understanding of all instructions and denied further questions.      Follow Up   Return if symptoms worsen or fail to improve.      Electronically signed by:    ELISHA Mckinnon  04/25/2022

## 2022-05-20 DIAGNOSIS — Z30.41 SURVEILLANCE FOR BIRTH CONTROL, ORAL CONTRACEPTIVES: ICD-10-CM

## 2022-05-20 RX ORDER — ACETAMINOPHEN AND CODEINE PHOSPHATE 120; 12 MG/5ML; MG/5ML
SOLUTION ORAL
Qty: 84 TABLET | Refills: 1 | Status: SHIPPED | OUTPATIENT
Start: 2022-05-20 | End: 2022-06-20

## 2022-06-20 ENCOUNTER — OFFICE VISIT (OUTPATIENT)
Dept: INTERNAL MEDICINE | Facility: CLINIC | Age: 37
End: 2022-06-20

## 2022-06-20 ENCOUNTER — LAB (OUTPATIENT)
Dept: LAB | Facility: HOSPITAL | Age: 37
End: 2022-06-20

## 2022-06-20 ENCOUNTER — HOSPITAL ENCOUNTER (OUTPATIENT)
Dept: CT IMAGING | Facility: HOSPITAL | Age: 37
Discharge: HOME | End: 2022-06-20

## 2022-06-20 VITALS
HEIGHT: 68 IN | OXYGEN SATURATION: 99 % | TEMPERATURE: 98 F | SYSTOLIC BLOOD PRESSURE: 108 MMHG | BODY MASS INDEX: 17.73 KG/M2 | DIASTOLIC BLOOD PRESSURE: 76 MMHG | WEIGHT: 117 LBS | HEART RATE: 98 BPM

## 2022-06-20 DIAGNOSIS — K92.1 HEMATOCHEZIA: ICD-10-CM

## 2022-06-20 DIAGNOSIS — R10.30 LOWER ABDOMINAL PAIN: ICD-10-CM

## 2022-06-20 DIAGNOSIS — K92.1 HEMATOCHEZIA: Primary | ICD-10-CM

## 2022-06-20 LAB
ALBUMIN SERPL-MCNC: 4.7 G/DL (ref 3.5–5.2)
ALBUMIN/GLOB SERPL: 2.6 G/DL
ALP SERPL-CCNC: 44 U/L (ref 39–117)
ALT SERPL W P-5'-P-CCNC: 6 U/L (ref 1–33)
ANION GAP SERPL CALCULATED.3IONS-SCNC: 10.9 MMOL/L (ref 5–15)
AST SERPL-CCNC: 17 U/L (ref 1–32)
BILIRUB SERPL-MCNC: 0.2 MG/DL (ref 0–1.2)
BUN SERPL-MCNC: 12 MG/DL (ref 6–20)
BUN/CREAT SERPL: 14.1 (ref 7–25)
CALCIUM SPEC-SCNC: 9 MG/DL (ref 8.6–10.5)
CHLORIDE SERPL-SCNC: 107 MMOL/L (ref 98–107)
CO2 SERPL-SCNC: 22.1 MMOL/L (ref 22–29)
CREAT SERPL-MCNC: 0.85 MG/DL (ref 0.57–1)
DEPRECATED RDW RBC AUTO: 42.4 FL (ref 37–54)
EGFRCR SERPLBLD CKD-EPI 2021: 91.8 ML/MIN/1.73
ERYTHROCYTE [DISTWIDTH] IN BLOOD BY AUTOMATED COUNT: 11.8 % (ref 12.3–15.4)
GLOBULIN UR ELPH-MCNC: 1.8 GM/DL
GLUCOSE SERPL-MCNC: 79 MG/DL (ref 65–99)
HCT VFR BLD AUTO: 43.9 % (ref 34–46.6)
HGB BLD-MCNC: 14.7 G/DL (ref 12–15.9)
MCH RBC QN AUTO: 32.2 PG (ref 26.6–33)
MCHC RBC AUTO-ENTMCNC: 33.5 G/DL (ref 31.5–35.7)
MCV RBC AUTO: 96.3 FL (ref 79–97)
PLATELET # BLD AUTO: 261 10*3/MM3 (ref 140–450)
PMV BLD AUTO: 9.3 FL (ref 6–12)
POTASSIUM SERPL-SCNC: 4.6 MMOL/L (ref 3.5–5.2)
PROT SERPL-MCNC: 6.5 G/DL (ref 6–8.5)
RBC # BLD AUTO: 4.56 10*6/MM3 (ref 3.77–5.28)
SODIUM SERPL-SCNC: 140 MMOL/L (ref 136–145)
WBC NRBC COR # BLD: 8.69 10*3/MM3 (ref 3.4–10.8)

## 2022-06-20 PROCEDURE — 80053 COMPREHEN METABOLIC PANEL: CPT

## 2022-06-20 PROCEDURE — 99214 OFFICE O/P EST MOD 30 MIN: CPT | Performed by: INTERNAL MEDICINE

## 2022-06-20 PROCEDURE — 85027 COMPLETE CBC AUTOMATED: CPT

## 2022-06-20 PROCEDURE — 74176 CT ABD & PELVIS W/O CONTRAST: CPT

## 2022-06-20 PROCEDURE — 0 DIATRIZOATE MEGLUMINE & SODIUM PER 1 ML: Performed by: INTERNAL MEDICINE

## 2022-06-20 RX ADMIN — DIATRIZOATE MEGLUMINE AND DIATRIZOATE SODIUM 15 ML: 660; 100 LIQUID ORAL; RECTAL at 13:00

## 2022-06-20 NOTE — PROGRESS NOTES
Internal Medicine Acute Visit    Chief Complaint   Patient presents with   • Black or Bloody Stool     Thursday had Sharp pains in the colon and the toilet was full of blood. Friday was better. Saturday more blood in the stool and had some dizziness. Was advised to go to ER via on call. Today has sharp pain in front right side. No blood this morning.        HPI  Ms. Carlson comes in today due to abdominal pain and bloody diarrhea. She notes diffuse abdominal cramping starting Thursday. She had a bowel movement and notes toilet full of bright red blood. She continued to have abdominal pain through the weekend but bleeding lessened to just being on the toilet paper. Today she had nonbloody BM. She notes sharp pain in RLQ but is also tender throughout the lower quadrants. She denies fever or foreign travel. She has not had vomiting but has been nauseated. She is eating well. She feels lethargic and fatigued.       Review of Systems  Review of Systems   Constitutional: Positive for fatigue. Negative for fever and unexpected weight loss.   Gastrointestinal: Positive for abdominal pain, blood in stool, diarrhea and nausea. Negative for constipation, vomiting, GERD and indigestion.        Medications  Current Outpatient Medications on File Prior to Visit   Medication Sig Dispense Refill   • ALPRAZolam (XANAX) 0.5 MG tablet Take 0.5 mg by mouth 2 (Two) Times a Day As Needed for Anxiety.     • butalbital-acetaminophen-caffeine (FIORICET, ESGIC) -40 MG per tablet Take 1 tablet by mouth Every 6 (Six) Hours As Needed for Migraine. Max 10 per month use sparingly 10 tablet 0   • OnabotulinumtoxinA 200 units reconstituted solution Inject 155 units IM into head neck shoulders every 12 weeks per FDA protocol Dx G43.719 1 each 3   • promethazine (PHENERGAN) 25 MG tablet Take 1 tablet by mouth Every 6 (Six) Hours As Needed for Nausea or Vomiting. 24 tablet 3   • topiramate (TOPAMAX) 100 MG tablet Take 1 tablet by mouth Daily.  "90 tablet 3   • [DISCONTINUED] ondansetron (Zofran) 4 MG tablet Take 1 tablet by mouth Every 8 (Eight) Hours As Needed for Nausea or Vomiting. 15 tablet 3   • [DISCONTINUED] norethindrone (Ocra) 0.35 MG tablet Take 1 tablet by mouth daily 84 tablet 1     No current facility-administered medications on file prior to visit.        Allergies  Allergies   Allergen Reactions   • Elemental Sulfur Hives   • Naproxen GI Intolerance   • Folic Acid Rash       PMH  Past Medical History:   Diagnosis Date   • Abnormal Pap smear of cervix    • Anxiety    • Bacterial vaginosis    • Dysfunctional uterine bleeding    • Headache    • Migraine    • Migraine    • Tobacco use 2/27/2017       Objective  Visit Vitals  /76   Pulse 98   Temp 98 °F (36.7 °C)   Ht 172.7 cm (68\")   Wt 53.1 kg (117 lb)   SpO2 99%   Breastfeeding No   BMI 17.79 kg/m²        Physical Exam  Physical Exam  Vitals and nursing note reviewed.   Constitutional:       General: She is not in acute distress.     Appearance: She is well-developed and normal weight. She is ill-appearing. She is not toxic-appearing.   HENT:      Head: Normocephalic and atraumatic.      Mouth/Throat:      Mouth: Mucous membranes are moist.   Eyes:      General: No scleral icterus.     Conjunctiva/sclera: Conjunctivae normal.   Cardiovascular:      Rate and Rhythm: Regular rhythm. Tachycardia present.      Heart sounds: Normal heart sounds. No murmur heard.  Pulmonary:      Effort: Pulmonary effort is normal. No respiratory distress.      Breath sounds: Normal breath sounds.   Abdominal:      General: Bowel sounds are increased. There is no distension.      Palpations: Abdomen is soft.      Tenderness: There is abdominal tenderness in the right lower quadrant, suprapubic area and left lower quadrant. There is no guarding or rebound.   Skin:     General: Skin is warm and dry.      Coloration: Skin is not jaundiced.   Neurological:      Mental Status: She is alert and oriented to person, " place, and time.         Results  Results for orders placed or performed in visit on 04/25/22   POCT rapid strep A    Specimen: Swab   Result Value Ref Range    Rapid Strep A Screen Negative Negative, VALID, INVALID, Not Performed    Internal Control Passed Passed    Lot Number [id number]     Expiration Date 10/27/23    POCT SARS-CoV-2 Antigen WILIAN + Flu    Specimen: Swab   Result Value Ref Range    SARS Antigen Not Detected Not Detected, Presumptive Negative    Influenza A Antigen WILIAN Not Detected Not Detected    Influenza B Antigen WILIAN Not Detected Not Detected    Internal Control Passed Passed    Lot Number [id number]     Expiration Date 2/2/23         Assessment and Plan  Diagnoses and all orders for this visit:    Hematochezia and Lower abdominal pain  - Has been present since Thursday and seemingly improving  - Highest on differential is infection. Other considerations are diverticular bleeding, colitis from IBD.  - Will obtain CBC, CMP, GI PCR, and STAT CT abd/pelvis wo contrast  - Recommend hydration and rest. To ED for increase in pain.      Return if symptoms worsen or fail to improve.

## 2022-06-21 ENCOUNTER — LAB (OUTPATIENT)
Dept: LAB | Facility: HOSPITAL | Age: 37
End: 2022-06-21

## 2022-06-21 DIAGNOSIS — R10.30 LOWER ABDOMINAL PAIN: ICD-10-CM

## 2022-06-21 DIAGNOSIS — K92.1 HEMATOCHEZIA: ICD-10-CM

## 2022-06-21 DIAGNOSIS — Q43.8 ENTERIC DUPLICATION CYST: Primary | ICD-10-CM

## 2022-06-21 LAB

## 2022-06-21 PROCEDURE — 87507 IADNA-DNA/RNA PROBE TQ 12-25: CPT

## 2022-07-22 ENCOUNTER — OFFICE VISIT (OUTPATIENT)
Dept: INTERNAL MEDICINE | Facility: CLINIC | Age: 37
End: 2022-07-22

## 2022-07-22 VITALS
BODY MASS INDEX: 17.73 KG/M2 | DIASTOLIC BLOOD PRESSURE: 68 MMHG | SYSTOLIC BLOOD PRESSURE: 108 MMHG | WEIGHT: 117 LBS | HEIGHT: 68 IN | OXYGEN SATURATION: 98 % | HEART RATE: 108 BPM

## 2022-07-22 DIAGNOSIS — R05.9 COUGH: ICD-10-CM

## 2022-07-22 DIAGNOSIS — J01.00 ACUTE NON-RECURRENT MAXILLARY SINUSITIS: ICD-10-CM

## 2022-07-22 DIAGNOSIS — J02.9 SORE THROAT: Primary | ICD-10-CM

## 2022-07-22 PROCEDURE — 87428 SARSCOV & INF VIR A&B AG IA: CPT | Performed by: NURSE PRACTITIONER

## 2022-07-22 PROCEDURE — 99213 OFFICE O/P EST LOW 20 MIN: CPT | Performed by: NURSE PRACTITIONER

## 2022-07-22 PROCEDURE — 87880 STREP A ASSAY W/OPTIC: CPT | Performed by: NURSE PRACTITIONER

## 2022-07-22 RX ORDER — DEXTROMETHORPHAN HYDROBROMIDE AND PROMETHAZINE HYDROCHLORIDE 15; 6.25 MG/5ML; MG/5ML
5 SYRUP ORAL NIGHTLY PRN
Qty: 60 ML | Refills: 0 | Status: SHIPPED | OUTPATIENT
Start: 2022-07-22

## 2022-07-22 RX ORDER — AMOXICILLIN AND CLAVULANATE POTASSIUM 875; 125 MG/1; MG/1
1 TABLET, FILM COATED ORAL 2 TIMES DAILY
Qty: 14 TABLET | Refills: 0 | Status: SHIPPED | OUTPATIENT
Start: 2022-07-22 | End: 2022-07-29

## 2022-07-22 NOTE — PROGRESS NOTES
Chief Complaint   Patient presents with   • Cough   • Sore Throat   • Nasal Congestion       History of Present Illness    36 y.o.female presents for cough, sore throat, congestion.  Constant nasal drainage cough onset for week. No fever. Sore throat from drianage. Blowing nose; feels pain pressure in ears.   Tried hot bath, benadryl, hot tea. Can't take speudofed products triggers migraines.     Review of Systems   Constitutional: Negative for chills and fever.   HENT: Positive for congestion, ear pain, postnasal drip, rhinorrhea, sinus pressure and sore throat. Negative for sneezing.    Respiratory: Positive for cough. Negative for shortness of breath.    Musculoskeletal: Negative for myalgias.   Neurological: Positive for headache.         [facility]  The following portions of the patient's history were reviewed and updated as appropriate: allergies, current medications, past family history, past medical history, past social history, past surgical history and problem list.     Past Medical History:   Diagnosis Date   • Abnormal Pap smear of cervix    • Anxiety    • Bacterial vaginosis    • Dysfunctional uterine bleeding    • Headache    • Migraine    • Migraine    • Tobacco use 2017      Allergies   Allergen Reactions   • Elemental Sulfur Hives   • Naproxen GI Intolerance   • Folic Acid Rash      Social History     Tobacco Use   • Smoking status: Former Smoker     Packs/day: 0.25     Years: 7.00     Pack years: 1.75     Types: Cigarettes     Quit date: 2017     Years since quittin.1   • Smokeless tobacco: Never Used   • Tobacco comment: chantix can not be filled due to recall   Vaping Use   • Vaping Use: Every day   Substance Use Topics   • Alcohol use: Yes     Comment: social   • Drug use: No     Past Surgical History:   Procedure Laterality Date   • DIAGNOSTIC LAPAROSCOPY EXPLORATORY LAPAROTOMY     • GALLBLADDER SURGERY  2020   • TOOTH EXTRACTION        Family History   Problem Relation Age of  "Onset   • Thyroid disease Mother    • Migraines Mother    • Diabetes Maternal Grandmother    • Cancer Maternal Grandmother    • Heart disease Maternal Grandmother    • Hyperlipidemia Maternal Grandmother    • Obesity Maternal Grandmother    • Diabetes Maternal Grandfather    • Migraines Maternal Grandfather    • Diabetes Paternal Grandmother    • Cancer Paternal Grandmother    • Diabetes Paternal Grandfather         type 1   • Arthritis Paternal Aunt         rheumatoid   • Lupus Paternal Aunt    • Other Other         cardiac disorder   • Stroke Other    • Diabetes Other    • Cancer Other            Current Outpatient Medications:   •  butalbital-acetaminophen-caffeine (FIORICET, ESGIC) -40 MG per tablet, Take 1 tablet by mouth Every 6 (Six) Hours As Needed for Migraine. Max 10 per month use sparingly, Disp: 10 tablet, Rfl: 0  •  promethazine (PHENERGAN) 25 MG tablet, Take 1 tablet by mouth Every 6 (Six) Hours As Needed for Nausea or Vomiting., Disp: 24 tablet, Rfl: 3  •  topiramate (TOPAMAX) 100 MG tablet, Take 1 tablet by mouth Daily., Disp: 90 tablet, Rfl: 3  •  ALPRAZolam (XANAX) 0.5 MG tablet, Take 0.5 mg by mouth 2 (Two) Times a Day As Needed for Anxiety., Disp: , Rfl:   •  OnabotulinumtoxinA 200 units reconstituted solution, Inject 155 units IM into head neck shoulders every 12 weeks per FDA protocol Dx G43.719, Disp: 1 each, Rfl: 3    VITALS:  /68   Pulse 108   Ht 172.7 cm (68\")   Wt 53.1 kg (117 lb)   LMP 06/30/2022   SpO2 98%   BMI 17.79 kg/m²     Physical Exam  Vitals reviewed.   Constitutional:       General: She is not in acute distress.     Appearance: She is not diaphoretic.   HENT:      Head: Normocephalic.      Right Ear: Ear canal and external ear normal. A middle ear effusion is present. Tympanic membrane is not injected, erythematous or bulging.      Left Ear: Ear canal and external ear normal. A middle ear effusion is present. Tympanic membrane is not injected, erythematous or " bulging.      Nose: Congestion and rhinorrhea present.      Right Turbinates: Swollen.      Left Turbinates: Swollen.      Mouth/Throat:      Mouth: Mucous membranes are moist.      Pharynx: Oropharynx is clear. Uvula midline. Posterior oropharyngeal erythema present. No pharyngeal swelling, oropharyngeal exudate or uvula swelling.   Eyes:      Extraocular Movements: Extraocular movements intact.      Conjunctiva/sclera: Conjunctivae normal.      Pupils: Pupils are equal, round, and reactive to light.   Cardiovascular:      Rate and Rhythm: Normal rate and regular rhythm.      Heart sounds: Normal heart sounds.   Pulmonary:      Effort: Pulmonary effort is normal. No respiratory distress.      Breath sounds: Normal breath sounds.   Neurological:      Mental Status: She is alert and oriented to person, place, and time.      Motor: No weakness.      Coordination: Coordination normal.      Gait: Gait normal.   Psychiatric:         Mood and Affect: Mood normal.         Result Review :            Assessment and Plan    Diagnoses and all orders for this visit:    1. Sore throat (Primary)  -     POCT rapid strep A    2. Cough  -     POCT SARS-CoV-2 Antigen WILIAN + Flu  -     promethazine-dextromethorphan (PROMETHAZINE-DM) 6.25-15 MG/5ML syrup; Take 5 mL by mouth At Night As Needed for Cough.  Dispense: 60 mL; Refill: 0    3. Acute non-recurrent maxillary sinusitis  -     amoxicillin-clavulanate (Augmentin) 875-125 MG per tablet; Take 1 tablet by mouth 2 (Two) Times a Day for 7 days.  Dispense: 14 tablet; Refill: 0      Can try oral antihistamine and/or flonase to help symptoms.  Stay hydrated.  Warm salt water gargles.    I discussed the patients findings and my recommendations with patient.  Patient was encouraged to keep me informed of any acute changes, lack of improvement, or any new concerning symptoms.  Patient voiced understanding of all instructions and denied further questions.      Follow Up   Return if symptoms  worsen or fail to improve.      Electronically signed by:    ELISHA Mckinnon  07/22/2022

## 2022-09-28 ENCOUNTER — TELEPHONE (OUTPATIENT)
Dept: INTERNAL MEDICINE | Facility: CLINIC | Age: 37
End: 2022-09-28

## 2022-09-28 NOTE — TELEPHONE ENCOUNTER
LVM FOR PATIENT TO RESCHEDULE NO SHOW APPOINTMENT ON 09/21/2022 AT 10:30AM WITH DR. GALINDO.    PLEASE ADVISE PATIENT OF OUR NO SHOW POLICY - 3 NO SHOW APPOINTMENTS WITHIN A 12-MONTH PERIOD CAN RESULT IN DISMISSAL FROM PRACTICE.    ALSO, RESCHEDULE FOR NEXT AVAILABLE APPOINTMEMNT WITH PROVIDER.    THANKS

## 2022-10-11 ENCOUNTER — TELEPHONE (OUTPATIENT)
Dept: INTERNAL MEDICINE | Facility: CLINIC | Age: 37
End: 2022-10-11

## 2022-10-11 NOTE — TELEPHONE ENCOUNTER
**HUB TO READ**    LVM FOR PATIENT TO RESCHEDULE NO SHOW APPOINTMENT ON 09/21/2022 AT 10:30AM WITH DR. GALINDO.    PLEASE ADVISE PATIENT OF OUR NO SHOW POLICY - 3 NO SHOW APPOINTMENTS WITHIN A 12-MONTH PERIOD CAN RESULT IN DISMISSAL FROM PRACTICE.    ALSO, RESCHEDULE FOR NEXT AVAILABLE APPOINTMEMNT WITH PROVIDER.    THANKS

## 2023-04-04 DIAGNOSIS — G43.719 INTRACTABLE CHRONIC MIGRAINE WITHOUT AURA AND WITHOUT STATUS MIGRAINOSUS: ICD-10-CM

## 2023-04-04 RX ORDER — TOPIRAMATE 100 MG/1
100 TABLET, FILM COATED ORAL DAILY
Qty: 90 TABLET | Refills: 3 | OUTPATIENT
Start: 2023-04-04

## 2024-08-22 NOTE — PROGRESS NOTES
Caller: ROMY    Relationship: [facility]    Best call back number: [phone]     What is the best time to reach you: ASAP    Who are you requesting to speak with (clinical staff, provider,  specific staff member):     NURSE OR MA    What was the call regarding:     PAP RESULTS     PLEASE FAX TO   [phone]   Subjective  Abdominal Pain (ongoing for 1 week ) and Vaginal Discharge (some burning when urinating )      Yari Carlson is a 31 y.o. female.   Allergies   Allergen Reactions   • Naproxen      History of Present Illness      Yellow vaginal discharge , low right abd pain , some burning with urination , laid in bed all day yesterday , vaginal itching   The following portions of the patient's history were reviewed and updated as appropriate: allergies, current medications, past family history, past medical history, past social history, past surgical history and problem list.    Review of Systems   Constitutional: Positive for fatigue.   Gastrointestinal: Positive for abdominal pain.   Genitourinary: Positive for dysuria and vaginal discharge.   All other systems reviewed and are negative.      Objective   Physical Exam   Constitutional: She is oriented to person, place, and time. She appears well-developed and well-nourished.   HENT:   Head: Normocephalic and atraumatic.   Eyes: Conjunctivae are normal.   Cardiovascular: Normal rate.    Pulmonary/Chest: Effort normal.   Abdominal: Soft. Bowel sounds are normal.   Neurological: She is alert and oriented to person, place, and time.   Skin: Skin is warm and dry.   Psychiatric: She has a normal mood and affect. Her behavior is normal. Judgment and thought content normal.   Nursing note and vitals reviewed.    /62  Pulse 110  Wt 112 lb (50.8 kg)  SpO2 99%  BMI 17.03 kg/m2    Assessment/Plan     Problem List Items Addressed This Visit     None      Visit Diagnoses     Acute cystitis without hematuria    -  Primary    Relevant Medications    nitrofurantoin, macrocrystal-monohydrate, (MACROBID) 100 MG capsule    Vaginal candidiasis        Relevant Medications    fluconazole (DIFLUCAN) 150 MG tablet